# Patient Record
Sex: FEMALE | Race: WHITE | NOT HISPANIC OR LATINO | URBAN - METROPOLITAN AREA
[De-identification: names, ages, dates, MRNs, and addresses within clinical notes are randomized per-mention and may not be internally consistent; named-entity substitution may affect disease eponyms.]

---

## 2017-08-19 ENCOUNTER — EMERGENCY (EMERGENCY)
Facility: HOSPITAL | Age: 57
LOS: 1 days | Discharge: HOME | End: 2017-08-19
Attending: EMERGENCY MEDICINE | Admitting: EMERGENCY MEDICINE
Payer: COMMERCIAL

## 2017-08-19 VITALS
OXYGEN SATURATION: 99 % | HEIGHT: 58 IN | DIASTOLIC BLOOD PRESSURE: 78 MMHG | SYSTOLIC BLOOD PRESSURE: 145 MMHG | WEIGHT: 125 LBS | TEMPERATURE: 100 F | RESPIRATION RATE: 18 BRPM | HEART RATE: 77 BPM

## 2017-08-19 DIAGNOSIS — Z87.39 PERSONAL HISTORY OF OTHER DISEASES OF THE MUSCULOSKELETAL SYSTEM AND CONNECTIVE TISSUE: Chronic | ICD-10-CM

## 2017-08-19 DIAGNOSIS — Z98.89 OTHER SPECIFIED POSTPROCEDURAL STATES: Chronic | ICD-10-CM

## 2017-08-19 PROCEDURE — 99285 EMERGENCY DEPT VISIT HI MDM: CPT

## 2017-08-19 PROCEDURE — 70481 CT ORBIT/EAR/FOSSA W/DYE: CPT | Mod: 26

## 2017-08-19 RX ORDER — METOCLOPRAMIDE HCL 10 MG
10 TABLET ORAL ONCE
Qty: 0 | Refills: 0 | Status: COMPLETED | OUTPATIENT
Start: 2017-08-19 | End: 2017-08-19

## 2017-08-19 RX ORDER — ACETAMINOPHEN 500 MG
1000 TABLET ORAL ONCE
Qty: 0 | Refills: 0 | Status: COMPLETED | OUTPATIENT
Start: 2017-08-19 | End: 2017-08-19

## 2017-08-19 RX ADMIN — Medication 400 MILLIGRAM(S): at 21:22

## 2017-08-19 RX ADMIN — Medication 10 MILLIGRAM(S): at 21:36

## 2017-08-19 NOTE — ED PROVIDER NOTE - MEDICAL DECISION MAKING DETAILS
57F presenting with 2 day history of worsening eye swelling, pain, and redness. On exam, noted for proptosis, pain with EOM movement, photophobia and tenderness around orbit. No changes in visual acuity and negative fluorescin eye stain exam. Given clinical picture, suspicion of orbital cellulitis. Low suspicion for temporal arteritis given age group and symptoms.  Order CT orbit, esr, cbc, cmp 57F presenting with 2 day history of worsening eye swelling, pain, and redness. On exam, noted for proptosis, pain with EOM movement, photophobia and tenderness around orbit. No changes in visual acuity and negative fluorescin eye stain exam. Given clinical picture, suspicion of orbital cellulitis. Low suspicion for temporal arteritis given age group and symptoms.  Order CT orbit, esr, cbc, cmp, crp and reassess 57F presenting with 2 day history of worsening eye swelling, pain, and redness. On exam, noted for proptosis, pain with EOM movement, photophobia and tenderness around orbit. No changes in visual acuity and negative fluorescin eye stain exam. Given clinical picture, suspicion of orbital cellulitis. Low suspicion for temporal arteritis given symptoms and minimal pain at TA.   Order CT orbit, esr, cbc, cmp, crp and reassess

## 2017-08-19 NOTE — ED ADULT NURSE NOTE - PSH
H/O endoscopy    H/O left breast biopsy  benign, 2005  H/O synovitis  right wrist-1990  H/O unilateral salpingectomy  and unilateral fallopian tube reconstruction-1987  S/P arthroscopy of shoulder  left, 9/2014

## 2017-08-19 NOTE — ED PROVIDER NOTE - ATTENDING CONTRIBUTION TO CARE
58F hx htn presents to the ED from urgent care for right eye redness pain and swelling. Symptoms started 2 days ago. no trauma. no change in vision. Now pt with redness and proptosis. no fevers. no nausea or vomiting. pain worse with eye movement. no jaw claudication. didn't take anything for the pain. on exam pt with scleral injection to right eye with proptosis. mild pain over right temporal region. visual acuity 20/50 bilaterally. pressure 9 in each eye. no corneal abrasions on flourescene testing. Will obtain labs esr crp ct orbits r/o orbial cellulitis and reassess.     Constitutional: No fever or chills  Eyes: + right eye pain and proprotsis  HEENT: No throat pain  CV: No chest pain  Resp: No SOB no cough  GI: No abd pain, nausea or vomiting  : No dysuria  MSK: No musculoskeletal pain  Skin: No rash  Neuro: No headache    Constitutional: mild distress AAOx3  Eyes: PERRLA EOMI right eye proprtosis 20/50 b/l no abrasions presssure 9 b/l   Head: Normocephalic atraumatic  Mouth: MMM  Cardiac: regular rate   Resp: Lungs CTAB  GI: Abd s/nt/nd  Neuro: CN2-12 intact  Skin: No rashes

## 2017-08-19 NOTE — ED PROVIDER NOTE - PLAN OF CARE
1. return for worsening symptoms or anything concerning to you  2. take all home meds as prescribed  3. follow up with your pmd call to make an appointment  4. Take Tylenol 650 mg every 6 hours as needed for pain.  5. take clindamycin as directed  6. see your ophthalmologist in 1-2 days

## 2017-08-19 NOTE — ED ADULT NURSE NOTE - OBJECTIVE STATEMENT
58 y/o female presents to ED c/o right eye redness since Wednesday. Pt went to urgent care today and was told to come to ED to r/o cellulitis. Pt states she has had sinusitis. Pt c/o headache and inflammation of eyelid. Denies blurry or double vision. Affected area reddened, inflamed. Eye appears to bulge out. Pt family at bedside.

## 2017-08-19 NOTE — ED PROVIDER NOTE - OBJECTIVE STATEMENT
57F with PMH of HTN presents with 2 day history of eye swelling. States that she did not experience any inciting events, no trauma, no scratching. Endorses worsening eye swelling, photophobia, pain around the eye, proptosis, and redness of the eye. Patient went to urgent care center and was directed to come to the ED

## 2017-08-19 NOTE — ED PROVIDER NOTE - CARE PLAN
Principal Discharge DX:	Preseptal cellulitis of right eye  Instructions for follow-up, activity and diet:	1. return for worsening symptoms or anything concerning to you  2. take all home meds as prescribed  3. follow up with your pmd call to make an appointment  4. Take Tylenol 650 mg every 6 hours as needed for pain.  5. take clindamycin as directed  6. see your ophthalmologist in 1-2 days

## 2017-08-19 NOTE — ED ADULT NURSE NOTE - PMH
Amblyopia of eye, bilateral    Anxiety and depression    Arthritis    Bursitis  left arm  Carpal tunnel syndrome, bilateral    Cataract    Environmental allergies    GERD (gastroesophageal reflux disease)    Hiatal hernia    HTN (hypertension)    Leaky heart valve  pt reports history of "leaky valve", states it is now no longer there?  Morbidly obese    OAB (overactive bladder)    Sciatica    Seasonal asthma    Sinusitis

## 2017-08-20 VITALS
DIASTOLIC BLOOD PRESSURE: 78 MMHG | OXYGEN SATURATION: 99 % | SYSTOLIC BLOOD PRESSURE: 127 MMHG | RESPIRATION RATE: 18 BRPM | HEART RATE: 72 BPM

## 2017-08-20 LAB — CRP SERPL-MCNC: 0.6 MG/DL — HIGH (ref 0–0.4)

## 2017-08-20 PROCEDURE — 96374 THER/PROPH/DIAG INJ IV PUSH: CPT | Mod: XU

## 2017-08-20 PROCEDURE — 85652 RBC SED RATE AUTOMATED: CPT

## 2017-08-20 PROCEDURE — 99284 EMERGENCY DEPT VISIT MOD MDM: CPT | Mod: 25

## 2017-08-20 PROCEDURE — 70481 CT ORBIT/EAR/FOSSA W/DYE: CPT

## 2017-08-20 PROCEDURE — 80053 COMPREHEN METABOLIC PANEL: CPT

## 2017-08-20 PROCEDURE — 85027 COMPLETE CBC AUTOMATED: CPT

## 2017-08-20 PROCEDURE — 86140 C-REACTIVE PROTEIN: CPT

## 2017-08-20 PROCEDURE — 96375 TX/PRO/DX INJ NEW DRUG ADDON: CPT | Mod: XU

## 2017-08-20 RX ADMIN — Medication 100 MILLIGRAM(S): at 00:39

## 2017-08-20 RX ADMIN — Medication 1000 MILLIGRAM(S): at 00:37

## 2017-08-22 ENCOUNTER — APPOINTMENT (OUTPATIENT)
Dept: OPHTHALMOLOGY | Facility: CLINIC | Age: 57
End: 2017-08-22
Payer: COMMERCIAL

## 2017-08-22 DIAGNOSIS — H49.21 SIXTH [ABDUCENT] NERVE PALSY, RIGHT EYE: ICD-10-CM

## 2017-08-22 PROBLEM — Z00.00 ENCOUNTER FOR PREVENTIVE HEALTH EXAMINATION: Status: ACTIVE | Noted: 2017-08-22

## 2017-08-22 PROCEDURE — 99205 OFFICE O/P NEW HI 60 MIN: CPT

## 2017-08-30 RX ADMIN — OXYCODONE HYDROCHLORIDE 5 MILLIGRAM(S): 5 TABLET ORAL at 23:12

## 2017-08-31 ENCOUNTER — INPATIENT (INPATIENT)
Facility: HOSPITAL | Age: 57
LOS: 2 days | Discharge: HOME | DRG: 125 | End: 2017-09-03
Attending: INTERNAL MEDICINE | Admitting: INTERNAL MEDICINE
Payer: COMMERCIAL

## 2017-08-31 ENCOUNTER — APPOINTMENT (OUTPATIENT)
Dept: OPHTHALMOLOGY | Facility: CLINIC | Age: 57
End: 2017-08-31
Payer: COMMERCIAL

## 2017-08-31 VITALS
OXYGEN SATURATION: 100 % | SYSTOLIC BLOOD PRESSURE: 123 MMHG | TEMPERATURE: 100 F | HEART RATE: 85 BPM | RESPIRATION RATE: 14 BRPM | DIASTOLIC BLOOD PRESSURE: 69 MMHG | WEIGHT: 125 LBS

## 2017-08-31 DIAGNOSIS — Z98.89 OTHER SPECIFIED POSTPROCEDURAL STATES: Chronic | ICD-10-CM

## 2017-08-31 DIAGNOSIS — Z87.39 PERSONAL HISTORY OF OTHER DISEASES OF THE MUSCULOSKELETAL SYSTEM AND CONNECTIVE TISSUE: Chronic | ICD-10-CM

## 2017-08-31 DIAGNOSIS — H57.12 OCULAR PAIN, LEFT EYE: ICD-10-CM

## 2017-08-31 LAB
ALBUMIN SERPL ELPH-MCNC: 4 G/DL — SIGNIFICANT CHANGE UP (ref 3.3–5)
ALP SERPL-CCNC: 43 U/L — SIGNIFICANT CHANGE UP (ref 40–120)
ALT FLD-CCNC: 22 U/L RC — SIGNIFICANT CHANGE UP (ref 10–45)
ANION GAP SERPL CALC-SCNC: 11 MMOL/L — SIGNIFICANT CHANGE UP (ref 5–17)
ANION GAP SERPL CALC-SCNC: 12 MMOL/L — SIGNIFICANT CHANGE UP (ref 5–17)
AST SERPL-CCNC: 72 U/L — HIGH (ref 10–40)
BASE EXCESS BLDV CALC-SCNC: 3.3 MMOL/L — HIGH (ref -2–2)
BASOPHILS # BLD AUTO: 0.1 K/UL — SIGNIFICANT CHANGE UP (ref 0–0.2)
BASOPHILS NFR BLD AUTO: 0.7 % — SIGNIFICANT CHANGE UP (ref 0–2)
BILIRUB SERPL-MCNC: 0.2 MG/DL — SIGNIFICANT CHANGE UP (ref 0.2–1.2)
BUN SERPL-MCNC: 16 MG/DL — SIGNIFICANT CHANGE UP (ref 7–23)
BUN SERPL-MCNC: 16 MG/DL — SIGNIFICANT CHANGE UP (ref 7–23)
CA-I SERPL-SCNC: 1.2 MMOL/L — SIGNIFICANT CHANGE UP (ref 1.12–1.3)
CALCIUM SERPL-MCNC: 9.4 MG/DL — SIGNIFICANT CHANGE UP (ref 8.4–10.5)
CALCIUM SERPL-MCNC: 9.6 MG/DL — SIGNIFICANT CHANGE UP (ref 8.4–10.5)
CHLORIDE BLDV-SCNC: 100 MMOL/L — SIGNIFICANT CHANGE UP (ref 96–108)
CHLORIDE SERPL-SCNC: 96 MMOL/L — SIGNIFICANT CHANGE UP (ref 96–108)
CHLORIDE SERPL-SCNC: 97 MMOL/L — SIGNIFICANT CHANGE UP (ref 96–108)
CO2 BLDV-SCNC: 29 MMOL/L — SIGNIFICANT CHANGE UP (ref 22–30)
CO2 SERPL-SCNC: 25 MMOL/L — SIGNIFICANT CHANGE UP (ref 22–31)
CO2 SERPL-SCNC: 26 MMOL/L — SIGNIFICANT CHANGE UP (ref 22–31)
CREAT SERPL-MCNC: 0.51 MG/DL — SIGNIFICANT CHANGE UP (ref 0.5–1.3)
CREAT SERPL-MCNC: 0.64 MG/DL — SIGNIFICANT CHANGE UP (ref 0.5–1.3)
CRP SERPL-MCNC: 0.4 MG/DL — SIGNIFICANT CHANGE UP (ref 0–0.4)
EOSINOPHIL # BLD AUTO: 0 K/UL — SIGNIFICANT CHANGE UP (ref 0–0.5)
EOSINOPHIL NFR BLD AUTO: 0.6 % — SIGNIFICANT CHANGE UP (ref 0–6)
ERYTHROCYTE [SEDIMENTATION RATE] IN BLOOD: 34 MM/HR — HIGH (ref 0–20)
GAS PNL BLDV: 131 MMOL/L — LOW (ref 136–145)
GAS PNL BLDV: SIGNIFICANT CHANGE UP
GAS PNL BLDV: SIGNIFICANT CHANGE UP
GLUCOSE BLDV-MCNC: 88 MG/DL — SIGNIFICANT CHANGE UP (ref 70–99)
GLUCOSE SERPL-MCNC: 113 MG/DL — HIGH (ref 70–99)
GLUCOSE SERPL-MCNC: 87 MG/DL — SIGNIFICANT CHANGE UP (ref 70–99)
HCO3 BLDV-SCNC: 28 MMOL/L — SIGNIFICANT CHANGE UP (ref 21–29)
HCT VFR BLD CALC: 40.4 % — SIGNIFICANT CHANGE UP (ref 34.5–45)
HCT VFR BLDA CALC: 39 % — SIGNIFICANT CHANGE UP (ref 39–50)
HGB BLD CALC-MCNC: 12.7 G/DL — SIGNIFICANT CHANGE UP (ref 11.5–15.5)
HGB BLD-MCNC: 13.3 G/DL — SIGNIFICANT CHANGE UP (ref 11.5–15.5)
LACTATE BLDV-MCNC: 0.7 MMOL/L — SIGNIFICANT CHANGE UP (ref 0.7–2)
LYMPHOCYTES # BLD AUTO: 2.6 K/UL — SIGNIFICANT CHANGE UP (ref 1–3.3)
LYMPHOCYTES # BLD AUTO: 32.8 % — SIGNIFICANT CHANGE UP (ref 13–44)
MCHC RBC-ENTMCNC: 32.5 PG — SIGNIFICANT CHANGE UP (ref 27–34)
MCHC RBC-ENTMCNC: 32.9 GM/DL — SIGNIFICANT CHANGE UP (ref 32–36)
MCV RBC AUTO: 98.8 FL — SIGNIFICANT CHANGE UP (ref 80–100)
MONOCYTES # BLD AUTO: 0.4 K/UL — SIGNIFICANT CHANGE UP (ref 0–0.9)
MONOCYTES NFR BLD AUTO: 5.3 % — SIGNIFICANT CHANGE UP (ref 2–14)
NEUTROPHILS # BLD AUTO: 4.8 K/UL — SIGNIFICANT CHANGE UP (ref 1.8–7.4)
NEUTROPHILS NFR BLD AUTO: 60.6 % — SIGNIFICANT CHANGE UP (ref 43–77)
OTHER CELLS CSF MANUAL: 13 ML/DL — LOW (ref 18–22)
PCO2 BLDV: 43 MMHG — SIGNIFICANT CHANGE UP (ref 35–50)
PH BLDV: 7.42 — SIGNIFICANT CHANGE UP (ref 7.35–7.45)
PLATELET # BLD AUTO: 361 K/UL — SIGNIFICANT CHANGE UP (ref 150–400)
PO2 BLDV: 42 MMHG — SIGNIFICANT CHANGE UP (ref 25–45)
POTASSIUM BLDV-SCNC: 3.7 MMOL/L — SIGNIFICANT CHANGE UP (ref 3.5–5)
POTASSIUM SERPL-MCNC: 3.9 MMOL/L — SIGNIFICANT CHANGE UP (ref 3.5–5.3)
POTASSIUM SERPL-MCNC: 9.6 MMOL/L — CRITICAL HIGH (ref 3.5–5.3)
POTASSIUM SERPL-SCNC: 3.9 MMOL/L — SIGNIFICANT CHANGE UP (ref 3.5–5.3)
POTASSIUM SERPL-SCNC: 9.6 MMOL/L — CRITICAL HIGH (ref 3.5–5.3)
PROT SERPL-MCNC: 8.6 G/DL — HIGH (ref 6–8.3)
RBC # BLD: 4.09 M/UL — SIGNIFICANT CHANGE UP (ref 3.8–5.2)
RBC # FLD: 12.8 % — SIGNIFICANT CHANGE UP (ref 10.3–14.5)
SAO2 % BLDV: 80 % — SIGNIFICANT CHANGE UP (ref 67–88)
SODIUM SERPL-SCNC: 133 MMOL/L — LOW (ref 135–145)
SODIUM SERPL-SCNC: 134 MMOL/L — LOW (ref 135–145)
T3 SERPL-MCNC: 98 NG/DL — SIGNIFICANT CHANGE UP (ref 80–200)
T4 AB SER-ACNC: 7.3 UG/DL — SIGNIFICANT CHANGE UP (ref 4.6–12)
TSH SERPL-MCNC: 1.22 UIU/ML — SIGNIFICANT CHANGE UP (ref 0.27–4.2)
WBC # BLD: 8 K/UL — SIGNIFICANT CHANGE UP (ref 3.8–10.5)
WBC # FLD AUTO: 8 K/UL — SIGNIFICANT CHANGE UP (ref 3.8–10.5)

## 2017-08-31 PROCEDURE — 99220: CPT

## 2017-08-31 PROCEDURE — 99215 OFFICE O/P EST HI 40 MIN: CPT

## 2017-08-31 PROCEDURE — 70450 CT HEAD/BRAIN W/O DYE: CPT | Mod: 26

## 2017-08-31 PROCEDURE — 92060 SENSORIMOTOR EXAMINATION: CPT

## 2017-08-31 RX ORDER — IBUPROFEN 200 MG
600 TABLET ORAL ONCE
Qty: 0 | Refills: 0 | Status: COMPLETED | OUTPATIENT
Start: 2017-08-31 | End: 2017-08-31

## 2017-08-31 RX ORDER — ACETAMINOPHEN 500 MG
1000 TABLET ORAL ONCE
Qty: 0 | Refills: 0 | Status: COMPLETED | OUTPATIENT
Start: 2017-08-31 | End: 2017-08-31

## 2017-08-31 RX ORDER — OXYCODONE HYDROCHLORIDE 5 MG/1
5 TABLET ORAL ONCE
Qty: 0 | Refills: 0 | Status: DISCONTINUED | OUTPATIENT
Start: 2017-08-31 | End: 2017-08-31

## 2017-08-31 RX ADMIN — Medication 400 MILLIGRAM(S): at 19:33

## 2017-08-31 RX ADMIN — OXYCODONE HYDROCHLORIDE 5 MILLIGRAM(S): 5 TABLET ORAL at 22:38

## 2017-08-31 RX ADMIN — Medication 1000 MILLIGRAM(S): at 20:12

## 2017-08-31 RX ADMIN — Medication 600 MILLIGRAM(S): at 12:40

## 2017-08-31 NOTE — ED CDU PROVIDER NOTE - ATTENDING CONTRIBUTION TO CARE
I have personally performed a face to face diagnostic evaluation on this patient.  I have reviewed the ACP note and agree with the history, exam, and plan of care, except as noted.  History and Exam by me shows  See ED provider note  Horacio Cevallos MD, Facep

## 2017-08-31 NOTE — ED ADULT NURSE REASSESSMENT NOTE - EENT WDL
Pts pupils are dilated for examination purpose by the ophthalmologist   Ears clean and dry and no hearing difficulties. Nose with pink mucosa and no drainage.  Mouth mucous membranes moist and pink.  No tenderness or swelling to throat or neck.
Eyes with no visual disturbances.  Ears clean and dry and no hearing difficulties. Nose with pink mucosa and no drainage.  Mouth mucous membranes moist and pink.  No tenderness or swelling to throat or neck.

## 2017-08-31 NOTE — CONSULT NOTE ADULT - ASSESSMENT
57 year old woman with known R 6th nerve palsy within last 2 weeks presents with eye pain on L with limited abduction.

## 2017-08-31 NOTE — ED CDU PROVIDER NOTE - PLAN OF CARE
Follow up with your Primary Care Physician within the next 2-3 days  Follow up with Neurology  Follow up with Ophthalmology  Bring a copy of your test results with you to your appointment  Continue your current medication regimen  Return to the Emergency Room if you experience new or worsening symptoms

## 2017-08-31 NOTE — ED CDU PROVIDER NOTE - MEDICAL DECISION MAKING DETAILS
57y pmh recently treated for left orbit swelling?infection now pw rt eye symptoms 6th nerve/ blurry vision for MRI to further eval process.

## 2017-08-31 NOTE — CONSULT NOTE ADULT - SUBJECTIVE AND OBJECTIVE BOX
NEUROLOGY CONSULT  MILDRED RESENDEZ       SUBJECTIVE  HPI:    57y old   woman who presents to ED referred by eye doctor with a chief complaint of left eye pain and decreased movement.  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  She had a normal MRI brain done since onset of symptoms within the last 10 days. She has associated pain around her eye which is worse with extremes of gaze.    She has a lifetime history of lazy eye on R which is more prominent when she is tired.  REVIEW OF SYSTEMS      General:	    Skin/Breast:  	  Ophthalmologic:  	  ENMT:	    Respiratory and Thorax:  	  Cardiovascular:	    Gastrointestinal:	    Genitourinary:	    Musculoskeletal:	    Neurological:	    Psychiatric:	    Hematology/Lymphatics:	    Endocrine:	    Allergic/Immunologic:	    PAST MEDICAL & SURGICAL HISTORY:  Leaky heart valve: pt reports history of &quot;leaky valve&quot;, states it is now no longer there?  Arthritis  Hiatal hernia  Amblyopia of eye, bilateral  Morbidly obese  Bursitis: left arm  Carpal tunnel syndrome, bilateral  Cataract  Sciatica  GERD (gastroesophageal reflux disease)  Sinusitis  Anxiety and depression  OAB (overactive bladder)  Environmental allergies  Seasonal asthma  HTN (hypertension)  S/P arthroscopy of shoulder: left, 9/2014  H/O endoscopy  H/O left breast biopsy: benign, 2005  H/O synovitis: right wrist-1990  H/O unilateral salpingectomy: and unilateral fallopian tube reconstruction-1987          latex (Pruritus)  No Known Drug Allergies      FAMILY HISTORY:  Family history of hypertension (Father)  Family history of cardiac pacemaker (Father)  Family history of discoid lupus (Mother)    Social History:    Marital Status:  (   )    (   ) Single    (   )    (  )   Occupation:   Lives with: (  ) alone  (  ) children   (  ) spouse   (  ) parents  (  ) other    Substance Use (street drugs): (  ) never used  (  ) other:  Tobacco Usage:  (   ) never smoked   (   ) former smoker   (   ) current smoker  (     ) pack year  (        ) last cigarette date  Alcohol Usage:  Sexual History:     Health Management     For female:   Last Mammo: (     ) No  (    ) Yes  (    ) Normal  (    ) Date   Last Pap: (     ) No  (    ) Yes  (    ) Normal   (    ) Date     For male:  Last prostate exam: (     ) No  (    ) Yes  (    ) Date  (    ) Normal    Immunization Hx:   (  ) flu shot                               (     ) date   (  ) pneumonia shot               (     ) date  (  ) tetanus                               (     ) date     (     ) Advanced Directives: (     ) None    (      ) DNR    (     ) DNI    (     ) Health Care Proxy:     OBJECTIVE  T(C): 37.6 (08-31-17 @ 11:25), Max: 37.6 (08-31-17 @ 11:25)  HR: 87 (08-31-17 @ 11:45) (85 - 87)  BP: 126/79 (08-31-17 @ 11:45) (123/69 - 126/79)  RR: 18 (08-31-17 @ 11:45) (14 - 18)  SpO2: 98% (08-31-17 @ 11:45) (98% - 100%)  Wt(kg): --      PHYSICAL EXAM:      Constitutional:    Eyes:    ENMT:    Neck:    Breasts:    Back:    Respiratory:    Cardiovascular:    Gastrointestinal:    Genitourinary:    Rectal:    Extremities:    Vascular:    Neurological:    Skin:    Lymph Nodes:    Musculoskeletal:    Psychiatric:            CAPILLARY BLOOD GLUCOSE        08-31    133<L>  |  96  |  16  ----------------------------<  113<H>  9.6<HH>   |  26  |  0.64    Ca    9.6      31 Aug 2017 12:26    TPro  8.6<H>  /  Alb  4.0  /  TBili  0.2  /  DBili  x   /  AST  72<H>  /  ALT  22  /  AlkPhos  43  08-31      CBC Full  -  ( 31 Aug 2017 12:26 )  WBC Count : 8.0 K/uL  Hemoglobin : 13.3 g/dL  Hematocrit : 40.4 %  Platelet Count - Automated : 361 K/uL  Mean Cell Volume : 98.8 fl  Mean Cell Hemoglobin : 32.5 pg  Mean Cell Hemoglobin Concentration : 32.9 gm/dL  Auto Neutrophil # : 4.8 K/uL  Auto Lymphocyte # : 2.6 K/uL  Auto Monocyte # : 0.4 K/uL  Auto Eosinophil # : 0.0 K/uL  Auto Basophil # : 0.1 K/uL  Auto Neutrophil % : 60.6 %  Auto Lymphocyte % : 32.8 %  Auto Monocyte % : 5.3 %  Auto Eosinophil % : 0.6 %  Auto Basophil % : 0.7 %          RADIOLOGY RESULTS: NEUROLOGY CONSULT  MILDRED RESENDEZ       SUBJECTIVE  57y old   woman who presents to ED referred by eye doctor with a chief complaint of left eye pain and decreased movement.  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  She had a normal MRI brain done since onset of symptoms within the last 10 days. She has associated pain around her L eye which is worse with extremes of gaze.    She has a lifetime history of lazy eye on R which is more prominent when she is tired.  REVIEW OF SYSTEMS      General:	no fever    Ophthalmologic: no diplopia.  per hpi  	  ENMT:	no trouble swallowing    Gastrointestinal:	 no vomiting    Musculoskeletal:	no weakness    Neurological:	no tingling no preceeding headache    Hematology/Lymphatics:	+ lymph nodes in neck    PAST MEDICAL & SURGICAL HISTORY:  Leaky heart valve: pt reports history of &quot;leaky valve&quot;, states it is now no longer there?  Arthritis  Hiatal hernia  Amblyopia of eye, bilateral  Morbidly obese  Bursitis: left arm  Carpal tunnel syndrome, bilateral  Cataract  Sciatica  GERD (gastroesophageal reflux disease)  Sinusitis  Anxiety and depression  OAB (overactive bladder)  Environmental allergies  Seasonal asthma  HTN (hypertension)  S/P arthroscopy of shoulder: left, 9/2014  H/O endoscopy  H/O left breast biopsy: benign, 2005  H/O synovitis: right wrist-1990  H/O unilateral salpingectomy: and unilateral fallopian tube reconstruction-1987      latex (Pruritus)  No Known Drug Allergies      FAMILY HISTORY:  Family history of hypertension (Father)  Family history of cardiac pacemaker (Father)  Family history of discoid lupus (Mother)    Social History:    Marital Status:  (  x )    (   ) Single    (   )    (  )   Lives with: (  ) alone  (  ) children   ( x ) spouse   (  ) parents  (  ) other    Substance Use (street drugs): (x  ) never used  (  ) other:  Tobacco Usage:  (   ) never smoked   (   ) former smoker   (  x ) current smoker  (  30   ) pack years  (  today      ) last cigarette date  Alcohol Usage: rare social    OBJECTIVE  T(C): 37.6 (08-31-17 @ 11:25), Max: 37.6 (08-31-17 @ 11:25)  HR: 87 (08-31-17 @ 11:45) (85 - 87)  BP: 126/79 (08-31-17 @ 11:45) (123/69 - 126/79)  RR: 18 (08-31-17 @ 11:45) (14 - 18)  SpO2: 98% (08-31-17 @ 11:45) (98% - 100%)  Wt(kg): --      PHYSICAL EXAM:      Constitutional: WDWN woman wearing large dark wraparound glasses     Eyes: + photophobia PERRLA     ENMT: normal oropharynx    Respiratory: normal rhythm and effort no accessory muscle use    Neurological:  Mental Status: AAOX4, fluent, attends b/l, appropriate  Cranial Nerves: Visual acuity intact bilaterally, visual fields full to confrontation, pupils equal round and reactive to light, extraocular motion partially limited abduction bilaterally, facial sensation intact symmetrically in V1-V3 bilaterally, face symmetrical, hearing was intact bilaterally,shoulder shrug symmetric and there was no tongue deviation with protrusion.  Motor: 5/5 strength in all extremities, normal tone and bulk.  Deep tendon reflexes:   Biceps/BR right 2+. Biceps/BR left 2+  Patella right 2+. Patella left 2+.  Plantar response downgoing bilaterally     Skin: warm and dry    Lymph Nodes: POSITIVE extensive anterior and posterior cervical lymphadenopathy (firm)    Psychiatric: normal mood affect and behavior        08-31    133<L>  |  96  |  16  ----------------------------<  113<H>  9.6<HH>   |  26  |  0.64    Ca    9.6      31 Aug 2017 12:26    TPro  8.6<H>  /  Alb  4.0  /  TBili  0.2  /  DBili  x   /  AST  72<H>  /  ALT  22  /  AlkPhos  43  08-31      CBC Full  -  ( 31 Aug 2017 12:26 )  WBC Count : 8.0 K/uL  Hemoglobin : 13.3 g/dL  Hematocrit : 40.4 %  Platelet Count - Automated : 361 K/uL  Mean Cell Volume : 98.8 fl  Mean Cell Hemoglobin : 32.5 pg  Mean Cell Hemoglobin Concentration : 32.9 gm/dL  Auto Neutrophil # : 4.8 K/uL  Auto Lymphocyte # : 2.6 K/uL  Auto Monocyte # : 0.4 K/uL  Auto Eosinophil # : 0.0 K/uL  Auto Basophil # : 0.1 K/uL  Auto Neutrophil % : 60.6 %  Auto Lymphocyte % : 32.8 %  Auto Monocyte % : 5.3 %  Auto Eosinophil % : 0.6 %  Auto Basophil % : 0.7 %          RADIOLOGY RESULTS:  < from: CT Orbit w/ IV Cont (08.19.17 @ 22:11) >  Mild right periorbital soft tissue swelling. The retrobulbar fat is   preserved. Both globesare intact. The extraocular muscles remain   symmetric. The superior ophthalmic veins are also symmetric. The orbital   rims are intact. The regional osseous structures are unremarkable.      < end of copied text >

## 2017-08-31 NOTE — ED CDU PROVIDER NOTE - FAMILY HISTORY
Mother  Still living? No  Family history of discoid lupus, Age at diagnosis: Age Unknown     Father  Still living? No  Family history of cardiac pacemaker, Age at diagnosis: Age Unknown  Family history of hypertension, Age at diagnosis: Age Unknown

## 2017-08-31 NOTE — ED PROVIDER NOTE - PROGRESS NOTE DETAILS
spoke with neuro, recommend MRI brain and orbits, may dc if normal. - PITER Salas ricardo saw pt, spoke with optho. outpt opthoneurologist, Dr. Bender, had recommended LP. Spoke with Dr. Bender will defer LP to neuro recommendations. neuro only recommending MRI only at this point as it is more likely microvascular cause so no need for LP at this point. - PITER Salas Dr. Padron; pt admitted as mri abnormal infectious vs neoplastic/granulomatous; iv antibiotics neuro/ophtho following

## 2017-08-31 NOTE — ED PROVIDER NOTE - CHPI ED SYMPTOMS NEG
no blurred vision/no vomiting/no numbness/no loss of consciousness/no confusion/no change in level of consciousness no loss of consciousness/no numbness/no vomiting/no blurred vision/no confusion

## 2017-08-31 NOTE — CONSULT NOTE ADULT - PROBLEM SELECTOR RECOMMENDATION 9
Check intraocular pressure rule out acute angle gluacoma.  Check ESR CRP to rule out GCA.  Suspect partial sixth nerve palsy on L.  It is possible to have microvascular ischemic cranial nerve palsies more than once.  The patient shows no signs of increased or decreased intracranial pressure which can occasionally cause bilateral sixth nerve palsies, though they would typically occur simultaneously. The proximity of the two nerve palsies raises the question of leptomeningeal process in the setting of heavy smoking and cervical LAD, thus I recommend repeating the MRI brain and orbits with/without contrast. Less likely ischemic cerebral event.

## 2017-08-31 NOTE — ED CDU PROVIDER NOTE - OBJECTIVE STATEMENT
Private Physician Nailni OWENS, Private Physician Katerine [address]  57yof pmhx Hypertension, Tobacco 1ppd, No dm,hld, allergy latex, cancer, coronary artery disease, travel. Pt comes to ed complains of rt eye swelling 8/17 seen in ED CT showed periorbital sts, esr/crp slightly elevated. Pt dc to [facility], MRI told to return in two weeks, Rt eye has gradually improved, Now left eye has become painful seen today by optho/[facility] and referred back to ed. Co blurry vision, with occasional diplopia, with photophobia while in direct sunlight. Has pain around left eye, no pain at present. Tylenol Lasts two hours. Nausea without vomiting, Pain worse with moving eyes. +dizzyness and feeling off balance.

## 2017-08-31 NOTE — CONSULT NOTE ADULT - ATTENDING COMMENTS
VASCULAR NEUROLOGY ATTENDING  The patient is seen and examined the history and imaging are reviewed. I agree with the resident note unless otherwise noted. L 6th nerve palsey. Patient with HTN and extensive smoking history. Advised to quit smoking, will need aggressive vascular risk factor modification. Outpatient neuro follow up.

## 2017-08-31 NOTE — ED PROVIDER NOTE - OBJECTIVE STATEMENT
Private Physician Nalini OWENS, Private Physician Katerine [address]  57y female pmh Hypertension, Tobacco 1ppd, No dm,hld, allergy latex, cancer, coronary artery disease, travel. Pt comes to ed complains of rt eye swelling 8/17 seen in ED CT showed periorbital sts, esr/crp slightly elevated. Pt dc to [facility], MRI told to return in two weeks, Rt eye has gradually improved, Now left eye has become painful seen today by optho/[facility] and referred back to ed. Co blurry vision, with occasional diplopia, with photophobia while in direct sunlight. Has pain around left eye, no pain at present. Tylenol Lasts two hours. Nausea without vomiting, Pain worse with moving eyes. +dizzyness and feeling off balance.

## 2017-08-31 NOTE — ED CDU PROVIDER NOTE - CHPI ED SYMPTOMS NEG
no confusion/no change in level of consciousness/no numbness/no blurred vision/no vomiting/no loss of consciousness

## 2017-08-31 NOTE — ED CDU PROVIDER NOTE - PROGRESS NOTE DETAILS
Patient resting in bed comfortably. No distress, no complaints. Vital Signs Stable. Patient reports mild headache. Will try IV tylenol. -Reji Wright PA-C patients  wants the chart to reflect that her voice has been a higher volume than previously for the past few months, and she often asks "excuse me" which may reflect recent changes in hearing. - Reji Wright PA-C Patient resting in bed comfortably. No distress. Vital Signs Stable. Headache and eye pain was relieved mildly with IV tylenol, and oxycodone 5 mg was given for additional pain control, will try motrin and valium if pain persists.  -Reji Wright PA-C Patient resting in bed comfortably. No distress, headache improved with Motrin and Valium. Vital Signs stable. MRI performed, awaiting result. - Reji Wright PA-C Pt c/o headache. Oxycodone ordered. Vital signs stable. Will continue to observe and reassess. Awaiting MRI results. -Alison Soliman PA-C I Clari Padron have personally performed a face to face diagnostic evaluation on this patient.  I have reviewed the ACP note and agree with the history, exam, and plan of care, except as noted.  History and Exam by me shows patient with continued diplopia; on exam + lateral rectus palsy, mild periorbital swelling; awaiting mri results; evaluated by neuro and ophtho, reassess Awaiting official MRI results. Pt seen by neurology and cleared for discharge as an outpatient. Pt c/o intermittent headache and diplopia.  Pt seen by optho who will discuss with oculoplastics and give further recommendations. D/W Dr. Padron. Ivania Soliman PA-C Dr. Padron; pt admitted for further work-up ; no antibiotics per id; admitted to medicine; MRI shows preseptal soft tissue swelling with differential diagnoses of orbital cellulitis, sarcoid, pseudotumor, neoplasm.  Still awaiting optho final recs. Discussed with Dr Padron who recommend admission to the hospital for IV antibiotics and further work up. -Alison Soliman PA-C

## 2017-08-31 NOTE — CONSULT NOTE ADULT - ASSESSMENT
Assessment  58 yo F, 2 weeks ago had right sided periocular pain, periorbital swelling and CN6 palsy. Now OD swelling resolved, CN6 palsy mostly resolved. But 5 days ago developed the same set of events in the left eye - periocular pain, swelling, eye turning in. Periocular swelling OD improving. Seen by neuro-ophthalmology today, Dx with left sided (now b/l) CN6 palsy and sent in for workup. DDx per neuro-ophthalmology: myasthenia (although does not explain periorbital swelling), orbital inflammatory disease, thyroid eye disease, orbital cellulitis, meningitis.    Neuro consult  Repeat MRI brain and orbits with and without contrast  Acetyl choline antibodies (binding, blocking, modulating)  Anti-MUSK antibodies  TFTs Assessment  56 yo F, 2 weeks ago had right sided periocular pain, periorbital swelling and CN6 palsy. Now OD swelling resolved, CN6 palsy mostly resolved. But 5 days ago developed the same set of events in the left eye - periocular pain, swelling, eye turning in. Periocular swelling OD improving. Seen by neuro-ophthalmology today, Dx with left sided (now b/l) CN6 palsy and sent in for workup. DDx per neuro-ophthalmology: myasthenia (although does not explain periorbital swelling), orbital inflammatory disease, thyroid eye disease, orbital cellulitis, meningitis.    Neuro consult  Repeat MRI brain and orbits with and without contrast  Acetylcholine receptor antibodies (binding, blocking, modulating)  Anti-MUSK antibodies  TFTs  Decision to perform LP per neuro    S/D/W Dr Mendez (attending) Assessment  58 yo F, 2 weeks ago had right sided periocular pain, periorbital swelling and CN6 palsy. Now OD swelling resolved, CN6 palsy mostly resolved. But 5 days ago developed the same set of events in the left eye - periocular pain, swelling, eye turning in. Periocular swelling OD improving. Seen by neuro-ophthalmology today, Dx with left sided (now b/l) CN6 palsy and sent in for workup. DDx per neuro-ophthalmology: myasthenia (although does not explain periorbital swelling), orbital inflammatory disease, thyroid eye disease, orbital cellulitis, meningitis.    Neuro consult  Repeat MRI brain and orbits with and without contrast  Acetylcholine receptor antibodies (binding, blocking, modulating)  Anti-MUSK antibodies  TFTs  Decision to perform LP per neuro. If LP performed, please also do opening pressure as increased ICP could conceivably cause b/l CN6 palsies and papilledema is not always seen in high ICP.    S/D/W Dr Mendez (attending) Assessment  58 yo F, 2 weeks ago had right sided periocular pain, periorbital swelling and CN6 palsy. Now OD swelling resolved, CN6 palsy mostly resolved. But 5 days ago developed the same set of events in the left eye - periocular pain, swelling, eye turning in. Periocular swelling OD improving. Seen by neuro-ophthalmology today, Dx with left sided (now b/l) CN6 palsy and sent in for workup. DDx per neuro-ophthalmology: myasthenia (although does not explain periorbital swelling), orbital inflammatory disease, thyroid eye disease, orbital cellulitis, meningitis.    Neuro consult  Repeat MRI brain and orbits with and without contrast  Acetylcholine receptor antibodies (binding, blocking, modulating)  Anti-MUSK antibodies  TFTs  Decision to perform LP per neuro but ophtho would lean towards doing it. If LP performed, please also do opening pressure as increased ICP could conceivably cause b/l CN6 palsies and papilledema is not always seen in high ICP, also please add cytology.    S/D/W Dr Mendez (attending) Assessment  56 yo F, 2 weeks ago had right sided periocular pain, periorbital swelling and CN6 palsy. Now OD swelling resolved, CN6 palsy mostly resolved. But 5 days ago developed the same set of events in the left eye - periocular pain, swelling, eye turning in. Periocular swelling OD improving. Seen by neuro-ophthalmology today, Dx with left sided (now b/l) CN6 palsy and sent in for workup. DDx per neuro-ophthalmology: myasthenia (although does not explain periorbital swelling), orbital inflammatory disease, thyroid eye disease, orbital cellulitis, meningitis.    Neuro consult  Repeat MRI brain and orbits with and without contrast  Acetylcholine receptor antibodies (binding, blocking, modulating)  Anti-MUSK antibodies  TFTs  Decision to perform LP per neuro. If LP performed, please also do opening pressure as increased ICP could conceivably cause b/l CN6 palsies and papilledema is not always seen in high ICP.    S/D/W Dr Mendez (attending)

## 2017-08-31 NOTE — ED CDU PROVIDER NOTE - CRANIAL NERVE AND PUPILLARY EXAM
cranial nerves 2-12 intact Spine appears normal, range of motion is not limited, no muscle or joint tenderness

## 2017-08-31 NOTE — ED ADULT NURSE NOTE - OBJECTIVE STATEMENT
Pt AXOX4, gross neuro intact, c/o worsening left eye and temporal pain since Sunday.  Pt states that since the 19th pt has been experiencing ongoing eye issues. Pt states that pain was in right eye at first and had CT on 19th with negative findings and told to follow up with opthalmology.  Pt went for MRI with negative findings and was diagnosed with 6th Nerve Palsy.  Pt calm, cooperative.  Pt reports blurred and at times doubled vision.  Pt well appearing.  Pt denies vomiting, f/c, bleeding, dark stools, injury/falls, dizziness.  Pt states today she did experience dizziness and nausea.  Pt currently denies both symptoms.  Pt well appearing.  VSS.   at bedside.  Will continue to monitor. Pt AXOX4, gross neuro intact, c/o worsening left eye and temporal pain since Sunday.  Pt states that since the 19th pt has been experiencing ongoing eye issues. Pt states that pain was in right eye at first and had CT on 19th with negative findings and told to follow up with opthalmology.  Pt went for MRI with negative findings and was diagnosed with 6th Nerve Palsy.  Pt calm, cooperative.  Pt reports blurred and at times doubled vision.  Pt well appearing.  Pt denies vomiting, f/c, bleeding, dark stools, injury/falls, dizziness.  Pt states today she did experience dizziness and nausea.  Pt currently denies both symptoms.  See neuro flow sheet in chart.  Pt well appearing.  VSS.   at bedside.  Will continue to monitor.

## 2017-09-01 DIAGNOSIS — H57.12 OCULAR PAIN, LEFT EYE: ICD-10-CM

## 2017-09-01 PROCEDURE — 70543 MRI ORBT/FAC/NCK W/O &W/DYE: CPT | Mod: 26

## 2017-09-01 PROCEDURE — 99217: CPT

## 2017-09-01 PROCEDURE — 71250 CT THORAX DX C-: CPT | Mod: 26

## 2017-09-01 PROCEDURE — 70553 MRI BRAIN STEM W/O & W/DYE: CPT | Mod: 26

## 2017-09-01 RX ORDER — FLUTICASONE PROPIONATE 50 MCG
2 SPRAY, SUSPENSION NASAL DAILY
Qty: 0 | Refills: 0 | Status: DISCONTINUED | OUTPATIENT
Start: 2017-09-01 | End: 2017-09-03

## 2017-09-01 RX ORDER — LACTOBACILLUS ACIDOPHILUS 100MM CELL
1 CAPSULE ORAL DAILY
Qty: 0 | Refills: 0 | Status: DISCONTINUED | OUTPATIENT
Start: 2017-09-01 | End: 2017-09-03

## 2017-09-01 RX ORDER — FESOTERODINE FUMARATE 8 MG/1
0 TABLET, FILM COATED, EXTENDED RELEASE ORAL
Qty: 0 | Refills: 0 | COMMUNITY

## 2017-09-01 RX ORDER — IBUPROFEN 200 MG
600 TABLET ORAL ONCE
Qty: 0 | Refills: 0 | Status: COMPLETED | OUTPATIENT
Start: 2017-09-01 | End: 2017-09-01

## 2017-09-01 RX ORDER — LINACLOTIDE 145 UG/1
290 CAPSULE, GELATIN COATED ORAL DAILY
Qty: 0 | Refills: 0 | Status: DISCONTINUED | OUTPATIENT
Start: 2017-09-01 | End: 2017-09-03

## 2017-09-01 RX ORDER — DIPHENHYDRAMINE HCL 50 MG
25 CAPSULE ORAL ONCE
Qty: 0 | Refills: 0 | Status: COMPLETED | OUTPATIENT
Start: 2017-09-01 | End: 2017-09-01

## 2017-09-01 RX ORDER — OXYCODONE AND ACETAMINOPHEN 5; 325 MG/1; MG/1
2 TABLET ORAL EVERY 4 HOURS
Qty: 0 | Refills: 0 | Status: DISCONTINUED | OUTPATIENT
Start: 2017-09-01 | End: 2017-09-03

## 2017-09-01 RX ORDER — HEPARIN SODIUM 5000 [USP'U]/ML
5000 INJECTION INTRAVENOUS; SUBCUTANEOUS EVERY 12 HOURS
Qty: 0 | Refills: 0 | Status: DISCONTINUED | OUTPATIENT
Start: 2017-09-01 | End: 2017-09-03

## 2017-09-01 RX ORDER — HYDROCHLOROTHIAZIDE 25 MG
25 TABLET ORAL DAILY
Qty: 0 | Refills: 0 | Status: DISCONTINUED | OUTPATIENT
Start: 2017-09-01 | End: 2017-09-03

## 2017-09-01 RX ORDER — HYDROCHLOROTHIAZIDE 25 MG
0 TABLET ORAL
Qty: 0 | Refills: 0 | COMMUNITY

## 2017-09-01 RX ORDER — ACETAMINOPHEN 500 MG
1000 TABLET ORAL ONCE
Qty: 0 | Refills: 0 | Status: COMPLETED | OUTPATIENT
Start: 2017-09-01 | End: 2017-09-01

## 2017-09-01 RX ORDER — DIAZEPAM 5 MG
5 TABLET ORAL ONCE
Qty: 0 | Refills: 0 | Status: DISCONTINUED | OUTPATIENT
Start: 2017-09-01 | End: 2017-09-01

## 2017-09-01 RX ORDER — OXYCODONE HYDROCHLORIDE 5 MG/1
5 TABLET ORAL ONCE
Qty: 0 | Refills: 0 | Status: DISCONTINUED | OUTPATIENT
Start: 2017-09-01 | End: 2017-09-01

## 2017-09-01 RX ORDER — FESOTERODINE FUMARATE 8 MG/1
8 TABLET, FILM COATED, EXTENDED RELEASE ORAL DAILY
Qty: 0 | Refills: 0 | Status: DISCONTINUED | OUTPATIENT
Start: 2017-09-01 | End: 2017-09-03

## 2017-09-01 RX ORDER — ASPIRIN/CALCIUM CARB/MAGNESIUM 324 MG
81 TABLET ORAL DAILY
Qty: 0 | Refills: 0 | Status: DISCONTINUED | OUTPATIENT
Start: 2017-09-01 | End: 2017-09-03

## 2017-09-01 RX ADMIN — Medication 600 MILLIGRAM(S): at 02:12

## 2017-09-01 RX ADMIN — Medication 5 MILLIGRAM(S): at 01:27

## 2017-09-01 RX ADMIN — Medication 25 MILLIGRAM(S): at 21:22

## 2017-09-01 RX ADMIN — OXYCODONE HYDROCHLORIDE 5 MILLIGRAM(S): 5 TABLET ORAL at 07:54

## 2017-09-01 RX ADMIN — Medication 400 MILLIGRAM(S): at 13:23

## 2017-09-01 RX ADMIN — OXYCODONE AND ACETAMINOPHEN 2 TABLET(S): 5; 325 TABLET ORAL at 20:00

## 2017-09-01 RX ADMIN — Medication 81 MILLIGRAM(S): at 08:13

## 2017-09-01 RX ADMIN — OXYCODONE AND ACETAMINOPHEN 2 TABLET(S): 5; 325 TABLET ORAL at 22:06

## 2017-09-01 RX ADMIN — OXYCODONE AND ACETAMINOPHEN 2 TABLET(S): 5; 325 TABLET ORAL at 22:40

## 2017-09-01 RX ADMIN — Medication 2 SPRAY(S): at 21:22

## 2017-09-01 RX ADMIN — Medication 600 MILLIGRAM(S): at 01:27

## 2017-09-01 RX ADMIN — Medication 1 TABLET(S): at 21:22

## 2017-09-01 RX ADMIN — OXYCODONE AND ACETAMINOPHEN 2 TABLET(S): 5; 325 TABLET ORAL at 19:01

## 2017-09-01 RX ADMIN — Medication 30 MILLIGRAM(S): at 21:23

## 2017-09-01 RX ADMIN — Medication 25 MILLIGRAM(S): at 22:06

## 2017-09-01 RX ADMIN — OXYCODONE HYDROCHLORIDE 5 MILLIGRAM(S): 5 TABLET ORAL at 08:45

## 2017-09-01 NOTE — CONSULT NOTE ADULT - ASSESSMENT
Pt has infiltrative process of eyes  no signs of infection on clinical exam  doubt this is bacterial infection   no lalo thrombosis process. discussed with eye  I suspect  it is lung cancer: or another non infectious process    Hold antibiotics   chest xray  neuro follow up   LP      discussed with opthal  thanks

## 2017-09-01 NOTE — CONSULT NOTE ADULT - SUBJECTIVE AND OBJECTIVE BOX
Patient is a 57y old  Female who presents with a chief complaint of   HPI: periorbital swelling and double vision lt eye  seen several weeks ago and given clinda for periorbital cellulitis in er  followed up by opthal and represented with pain behind eye and persist 6 th cran nerve palsy.  no fever or chills  recently lost 100 lbs on diet  long term smoker. mri scan demonstrates bilateral lt greater tahn right hayley orbital w=infiltrative process.       PAST MEDICAL & SURGICAL HISTORY:  Leaky heart valve: pt reports history of &quot;leaky valve&quot;, states it is now no longer there?  Arthritis  Hiatal hernia  Amblyopia of eye, bilateral  Morbidly obese  Bursitis: left arm  Carpal tunnel syndrome, bilateral  Cataract  Sciatica  GERD (gastroesophageal reflux disease)  Sinusitis  Anxiety and depression  OAB (overactive bladder)  Environmental allergies  Seasonal asthma  HTN (hypertension)  S/P arthroscopy of shoulder: left, 9/2014  H/O endoscopy  H/O left breast biopsy: benign, 2005  H/O synovitis: right wrist-1990  H/O unilateral salpingectomy: and unilateral fallopian tube reconstruction-1987      Social history:    FAMILY HISTORY:  Family history of hypertension (Father)  Family history of cardiac pacemaker (Father)  Family history of discoid lupus (Mother)    REVIEW OF SYSTEMS  General:	Denies any malaise fatigue or chills. Fevers absent    Skin:No rash  	  Ophthalmologic:Denies aas per HPI  	  ENMT:No nasal discharge,headache,sinus congestion or throat pain.No dental complaints    Respiratory and Thorax:No cough,sputum or chest pain.Denies shortness of breath  	  Cardiovascular:	No chest pain,palpitaions or dizziness    Gastrointestinal:	NO nausea,abdominal pain or diarrhea.    Genitourinary:	No dysuria,frequency. No flank pain    Musculoskeletal:	No joint swelling or pain.No weakness    Neurological:No confusion,diziness.No extremity weakness.No bladder or bowel incontinence	    Psychiatric:No delusions or hallucinations	    Hematology/Lymphatics:	No LN swelling.No gum bleeding     Endocrine:	No recent weight gain or loss.No abnormal heat/cold intolerance    Allergic/Immunologic:	No hives or rash   Allergies    latex (Pruritus)  No Known Drug Allergies    Intolerances        Antimicrobials:          Vital Signs Last 24 Hrs  T(C): 37.1 (01 Sep 2017 16:01), Max: 37.2 (01 Sep 2017 07:33)  T(F): 98.7 (01 Sep 2017 16:01), Max: 98.9 (01 Sep 2017 07:33)  HR: 64 (01 Sep 2017 16:01) (64 - 68)  BP: 116/70 (01 Sep 2017 16:01) (108/72 - 130/77)  BP(mean): --  RR: 16 (01 Sep 2017 16:01) (16 - 18)  SpO2: 100% (01 Sep 2017 16:01) (97% - 100%)    PHYSICAL EXAM:Pleasant patient in no acute distress.      Constitutional:Comfortable.Awake and alert  No cachexia     Eyes minimal swelling of eye   no conjuct 6 th nerve palsy     ENMT:No sinus tenderness.No thrush.No pharyngeal exudate or erythema.Fair dental hygiene    Neck:Supple,No LN,no JVD      Respiratory:Good air entry bilaterally,CTA    Gastrointestinal:Soft BS(+) no tenderness no masses ,No rebound or guarding    Genitourinary:No CVA tendereness     Rectal:    Extremities:No cyanosis,clubbing or edema.    Vascular:peripheral pulses felt    Neurological:AAO X 3,No grossly focal deficits    Skin:No rash     Lymph Nodes:No palpable LNs    Musculoskeletal:No joint swelling or LOM                                    13.3   8.0   )-----------( 361      ( 31 Aug 2017 12:26 )             40.4         08-31    134<L>  |  97  |  16  ----------------------------<  87  3.9   |  25  |  0.51    Ca    9.4      31 Aug 2017 14:29    TPro  8.6<H>  /  Alb  4.0  /  TBili  0.2  /  DBili  x   /  AST  72<H>  /  ALT  22  /  AlkPhos  43  08-31      RECENT CULTURES:      MICROBIOLOGY:          Radiology:      Assessment:        Recommendations and Plan:    Pager [phone]  After 5 pm/weekends or if no response :[phone]

## 2017-09-01 NOTE — H&P ADULT - FAMILY HISTORY
<<-----Click on this checkbox to enter Family History Family history of discoid lupus     Family history of hypertension     Father  Still living? Unknown  Family history of cardiac pacemaker, Age at diagnosis: Age Unknown

## 2017-09-01 NOTE — ED ADULT NURSE REASSESSMENT NOTE - NURSING NEURO ORIENTATION
oriented to person, place and time

## 2017-09-01 NOTE — ED ADULT NURSE REASSESSMENT NOTE - NS ED NURSE REASSESS COMMENT FT1
Pt stable in no apparent distress.  Pt calm, well appearing, updated on status.
Pt AO4. Neuro check intact except pt complains of headache behind left eye & double vision. No other deficits noted. Safety maintained. Will continue to monitor.
Pt AO4. Neuro check intact except headache behind L eye & diplopia. No other deficits noted. Safety maintained. Will continue to monitor.
1600 Received the patient from  by Stephany Obrien. Pt is awaiting for MRI test . Pt was evaluated by PITER MUNOZ . pt is made oriented to the unit Treatment plan explained to the patient. Pt is made comfortable  safety measures & comfort care provided. PITER Mederos explained and answered all questions pertaining to the patient care to the patient & family.  Pt denies any pain now Pt has dilated pupils due to the eye drops which was provided by the ophthalmologist during the eye eval process. Continue to monitor
Pt received from JOMAR Gabriel. Pt oriented to CDU & plan of care was discussed. Pt AO4. Neuro check intact except pt states she has double vision but can focus her eyes when one is closed or with different positions. No other deficits noted. Safety & comfort measures maintained. Call bell in reach. Will continue to monitor.

## 2017-09-01 NOTE — ED ADULT NURSE REASSESSMENT NOTE - NURSING NEURO LEVEL OF CONSCIOUSNESS
alert and awake/follows commands
follows commands/alert and awake

## 2017-09-01 NOTE — H&P ADULT - HISTORY OF PRESENT ILLNESS
pt is a 57 yr old female w/: periorbital swelling and double vision in  lt eye  seen several weeks ago and given clindamycin  for periorbital cellulitis in er  followed up by opthalmology  and presented with pain behind eye and persist 6 th cranial  nerve palsy.  no fever or chills  recently lost 100 lbs on diet  long term smoker. mri scan demonstrates bilateral lt greater than  right hayley orbital w/ infiltrative process.

## 2017-09-01 NOTE — PROGRESS NOTE ADULT - ASSESSMENT
Assessment  58 yo F, 2 weeks ago had right sided periocular pain, periorbital swelling and CN6 palsy. Now OD swelling resolved, CN6 palsy mostly resolved. But 1 week ago developed the same set of events in the left eye - periocular pain, swelling, eye turning in. Now has b/l CN6 palsy. Also with pain on EOM OS. No fever or white count, orbital cellulitis unlikely. MRI shows preseptal soft tissue swelling on the left as well as retrobulbar fat infiltration and perioptic fat plane infiltration/enhancement left greater than right. 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 Other DDx include IgG4-related ophthalmic disease.    Plan  Trial of 1g IV solumedrol  Antibiotics not required  Please send labs: IgG4, IgE, hypergammaglobulins    D/W Dr Gayle (neuro-ophthalmology) and Dr Melara (oculoplastics)

## 2017-09-01 NOTE — H&P ADULT - NSHPLABSRESULTS_GEN_ALL_CORE
13.3   8.0   )-----------( 361      ( 31 Aug 2017 12:26 )             40.4       08-31    134<L>  |  97  |  16  ----------------------------<  87  3.9   |  25  |  0.51    Ca    9.4      31 Aug 2017 14:29    TPro  8.6<H>  /  Alb  4.0  /  TBili  0.2  /  DBili  x   /  AST  72<H>  /  ALT  22  /  AlkPhos  43  08-31                      Lactate Trend            CAPILLARY BLOOD GLUCOSE

## 2017-09-01 NOTE — H&P ADULT - ASSESSMENT
pt w/ diplopia / eye pain periorbital swelling   ?infection not likely as per id   r/o neoplastic causes  onc eval  ct chest  optho/neuro f/u  cont outpt meds  dvt proph   analgesics  discussed w/ pt and  at bedside

## 2017-09-02 LAB
ACHR BIND AB SER-ACNC: <0.3 NMOL/L — SIGNIFICANT CHANGE UP
ANION GAP SERPL CALC-SCNC: 14 MMOL/L — SIGNIFICANT CHANGE UP (ref 5–17)
BUN SERPL-MCNC: 17 MG/DL — SIGNIFICANT CHANGE UP (ref 7–23)
CALCIUM SERPL-MCNC: 9.9 MG/DL — SIGNIFICANT CHANGE UP (ref 8.4–10.5)
CHLORIDE SERPL-SCNC: 96 MMOL/L — SIGNIFICANT CHANGE UP (ref 96–108)
CO2 SERPL-SCNC: 26 MMOL/L — SIGNIFICANT CHANGE UP (ref 22–31)
CREAT SERPL-MCNC: 0.57 MG/DL — SIGNIFICANT CHANGE UP (ref 0.5–1.3)
GLUCOSE SERPL-MCNC: 115 MG/DL — HIGH (ref 70–99)
IGE SERPL-ACNC: 136 IU/ML — HIGH (ref 0–100)
POTASSIUM SERPL-MCNC: 4 MMOL/L — SIGNIFICANT CHANGE UP (ref 3.5–5.3)
POTASSIUM SERPL-SCNC: 4 MMOL/L — SIGNIFICANT CHANGE UP (ref 3.5–5.3)
SODIUM SERPL-SCNC: 136 MMOL/L — SIGNIFICANT CHANGE UP (ref 135–145)

## 2017-09-02 RX ADMIN — OXYCODONE AND ACETAMINOPHEN 2 TABLET(S): 5; 325 TABLET ORAL at 05:07

## 2017-09-02 RX ADMIN — Medication 1 TABLET(S): at 11:40

## 2017-09-02 RX ADMIN — OXYCODONE AND ACETAMINOPHEN 2 TABLET(S): 5; 325 TABLET ORAL at 09:40

## 2017-09-02 RX ADMIN — Medication 81 MILLIGRAM(S): at 11:40

## 2017-09-02 RX ADMIN — FESOTERODINE FUMARATE 8 MILLIGRAM(S): 8 TABLET, FILM COATED, EXTENDED RELEASE ORAL at 11:40

## 2017-09-02 RX ADMIN — OXYCODONE AND ACETAMINOPHEN 2 TABLET(S): 5; 325 TABLET ORAL at 02:01

## 2017-09-02 RX ADMIN — Medication 30 MILLIGRAM(S): at 21:45

## 2017-09-02 RX ADMIN — OXYCODONE AND ACETAMINOPHEN 2 TABLET(S): 5; 325 TABLET ORAL at 05:39

## 2017-09-02 RX ADMIN — OXYCODONE AND ACETAMINOPHEN 2 TABLET(S): 5; 325 TABLET ORAL at 01:16

## 2017-09-02 RX ADMIN — OXYCODONE AND ACETAMINOPHEN 2 TABLET(S): 5; 325 TABLET ORAL at 10:10

## 2017-09-02 RX ADMIN — Medication 25 MILLIGRAM(S): at 21:45

## 2017-09-02 RX ADMIN — Medication 116 MILLIGRAM(S): at 12:48

## 2017-09-02 NOTE — CONSULT NOTE ADULT - SUBJECTIVE AND OBJECTIVE BOX
Ophthalmology Progress Note    Subjective: No clinical change. Still binocular horizontal diplopia, left periocular swelling stable, mild pain OS on EOM. Did not receive solumedrol yesterday, just received the dose at noon today. Pt was seen as solumedrol is hanging.    HPI: 56 yo F, 2 weeks ago had right sided severe periocular pain, periorbital swelling, proptosis and CN6 palsy, although eye was not injected. Now OD swelling resolved, CN6 palsy mostly resolved. But 5 days ago developed the same set of events in the left eye - periocular pain, swelling, eye turning in. Periocular swelling OD improving. Seen by neuro-ophthalmology prior to admission, Dx with left sided (now b/l) CN6 palsy and sent in for workup. DDx per neuro-ophthalmology: myasthenia (although does not explain periorbital swelling), orbital inflammatory disease, thyroid eye disease, orbital cellulitis, meningitis    Mood and Affect Appropriate ( x 3),  Oriented to Time, Place, and Person x 3     Ophthalmology Exam    Visual acuity (sc): 20/20 OU  Pupils: PERRL OU, no APD  Ttono: 14 OU  Extraocular movements (EOMs): 90% abduction OU, otherwise full OU; binocular and monocular OS horizontal diplopia on lateral gaze only; mild pain on EOM OS  Confrontational Visual Field (CVF):  Full OU  Color Plates: 12/12 OU    Pen Light Exam (PLE)  External:  Flat OD, 1+periorbital edema OS  Lids/Lashes/Lacrimal Ducts: Flat OD, 1+lid edema OS  Sclera/Conjunctiva:  W+Q OU  Cornea: Cl OU  Anterior Chamber: D+F OU  Iris:  Flat OU    Diagnostic Testing:  Afebrile, WBC normal  ESR: 29 -> 34 (mildly elevated)  CRP: 0.6 -> 0.4 (wnl)  IggE: 136 (elevated)  Thyroid panel wnl    CT orbita with contrast:  Redemonstration of left periorbital tissue swelling as well as   questionable fat stranding in the retro-orbital space, left greater than   right, as mentioned in the prior CT orbit study. Consider MR of the   orbits further evaluation.  No acute intracranial hemorrhage, brain edema, mass effect or acute   territorial infarct.     MRI brain, orbit w/wo  Preseptal soft tissue swelling on the left as well as retrobulbar fat infiltration and perioptic fat plane   infiltration/enhancement left greater than right. Inflammatory etiologies   such as orbital cellulitis, granulomatous disease such as sarcoidosis or   pseudotumor are considerations. Neoplastic infiltration such as lymphoma   cannot be excluded.    Assessment and Plan:   · Assessment		  Assessment  56 yo F, 2 weeks ago had right sided periocular pain, periorbital swelling and CN6 palsy. Now OD swelling resolved, CN6 palsy mostly resolved. But 1 week ago developed the same set of events in the left eye - periocular pain, swelling, eye turning in. Now has b/l CN6 palsy. Also with pain on EOM OS. No fever or white count, orbital cellulitis unlikely. MRI shows preseptal soft tissue swelling on the left as well as retrobulbar fat infiltration and perioptic fat plane infiltration/enhancement left greater than right. 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 Other DDx include IgG4-related ophthalmic disease.    Plan  - pt is s/p trial of 1g IV solumedrol (received today 9/2/17 at 1pm), we will follow clinical response tomorrow  - antibiotics not required  - f/u labs: IgG4, IgE (elevated), hypergammaglobulins

## 2017-09-02 NOTE — CONSULT NOTE ADULT - ASSESSMENT
57F with hx axillary lymphadenopathy s/p biopsy with left periorbital edema/pain/diplopia, MRI with infiltrative process    #Left eye CN 6 palsy- with similar symptoms right eye 2 weeks prior with those symptoms mostly resolved; MRI with fat infiltration  - Opthalmology and neurology following; s/p solumedrol  - per Optho, most likely pseudotumor; MRI cannot exclude lymphoma- no evidence of other CNS lesion; would follow with Optho for further evaluation and to determine if biopsy will be required  - no evidence of systemic lymphoma on eval thus far    #axillary lymphadenopathy- reports has been present, seen on mammogram, s/p biopsy approx 6 months prior was benign; also with benign appearance on CT; no symptoms to suggest systemic lymphoma  - due for mammogram in october    recommended following as outpatient to review previous imaging and biopsy results  DC plan per Medicine    d/w NP

## 2017-09-02 NOTE — PROGRESS NOTE ADULT - ASSESSMENT
pt w/ diplopia / eye pain periorbital swelling   ?infection not likely as per id   r/o neoplastic causes  onc eval today   ct chest neg / only axillary nodes and pt states got biopsy 6 months ago   optho/neuro f/u  cont outpt meds  dvt proph   analgesics  discussed w/ pt and  at bedside   d/c if ok w. all specialists w/ outpt f/u

## 2017-09-02 NOTE — CONSULT NOTE ADULT - SUBJECTIVE AND OBJECTIVE BOX
Patient is a 57y old  Female who presents with a chief complaint of swollen eyes (01 Sep 2017 19:27)      HPI: The pt is a 57F with left eye pain/swelling/double vision. Pt had similar symptoms in right eye 2 weeks prior, was seen in ED, diagnosed with CN 6 palsy and followed by Opthalmology as outpatient. given clindamycin for cellulitis. Pt reports left eye pain is controlled on medications, double vision and swelling slightly less. Pt denies fever or chills. Pt was in usual state of health prior to these symptoms. appetite/weight stable, lost more than 100lb intentionally. + tob. no night sweats. energy stable.  with known axillary lymph nodes, s/p biopsy approx 6 months prior- benign    PAST MEDICAL & SURGICAL HISTORY:  Leaky heart valve  Arthritis  Hiatal hernia  Amblyopia of eye, bilateral  Morbidly obese- s/p weight loss 135 lb  lymph node biopsy Right axilla approx 6 months prior- benign  Bursitis: left arm  Carpal tunnel syndrome, bilateral  Cataract  Sciatica  GERD (gastroesophageal reflux disease)  Sinusitis  Anxiety and depression  OAB (overactive bladder)  Environmental allergies  Seasonal asthma  HTN (hypertension)  S/P arthroscopy of shoulder: left, 9/2014  H/O endoscopy  H/O left breast biopsy: benign, 2005  H/O synovitis: right wrist-1990  H/O unilateral salpingectomy: and unilateral fallopian tube reconstruction-1987      SOCIAL HISTORY:  Smoking - +TOB   Alcohol - Social  Drugs - No drug use  works for [employer] department    FAMILY HISTORY:  Family history of hypertension  Family history of cardiac pacemaker (Father)  Family history of lupus -Mother  Crohn's, UC    MEDICATIONS  (STANDING):  aspirin enteric coated 81 milliGRAM(s) Oral daily  heparin  Injectable 5000 Unit(s) SubCutaneous every 12 hours  busPIRone 30 milliGRAM(s) Oral at bedtime  hydrochlorothiazide 25 milliGRAM(s) Oral daily  fluticasone propionate 50 MICROgram(s)/spray Nasal Spray 2 Spray(s) Both Nostrils daily  linaclotide 290 MICROGram(s) Oral daily  fesoterodine ER Tablet 8 milliGRAM(s) Oral daily  lactobacillus acidophilus 1 Tablet(s) Oral daily  methylPREDNISolone sodium succinate IVPB 1000 milliGRAM(s) IV Intermittent once    MEDICATIONS  (PRN):  oxyCODONE    5 mG/acetaminophen 325 mG 2 Tablet(s) Oral every 4 hours PRN Severe Pain (7 - 10)      Allergies    latex (Pruritus)  No Known Drug Allergies    ROS:  gen- weight stable, energy/appetite stable  heent- eye pain/swelling/double vision per HPI; no uri sx  cv- no chest pain, no palpitation  resp- no dyspnea, no cough, no nichole  gi- no n/v/abd pain, sl constipation, no BRBPR/melena  gu- no hematuria  ext- no leg swelling/pain  ROS otherwise negative      Vital Signs Last 24 Hrs  T(C): 36.8 (02 Sep 2017 11:36), Max: 37.1 (01 Sep 2017 16:01)  T(F): 98.3 (02 Sep 2017 11:36), Max: 98.7 (01 Sep 2017 16:01)  HR: 983 (02 Sep 2017 11:36) (64 - 983)  BP: 110/76 (02 Sep 2017 11:36) (104/64 - 145/76)  BP(mean): --  RR: 18 (02 Sep 2017 11:36) (16 - 18)  SpO2: 96% (02 Sep 2017 11:36) (96% - 100%)    PHYSICAL EXAM  General: adult in NAD  HEENT: clear oropharynx, anicteric sclera, pink conjunctiva, +left periorbital edema  Neck: supple  CV: normal S1/S2 with no murmur rubs or gallops  Lungs: clear to auscultation, no wheezes, no rales  Abdomen: soft non-tender non-distended, no hepatosplenomegaly, positive bowel sounds  Ext: no clubbing cyanosis or edema  Skin: no rashes and no petechiae  Lymph Nodes: No LAD in axillae, groin, neck  Neuro: alert and oriented X 3, no focal deficits    LABS:            Serial CBC's  08-31 @ 12:26  Hct-40.4 / Hgb-13.3 / Plat-361 / RBC-4.09 / WBC-8.0      08-31    134<L>  |  97  |  16  ----------------------------<  87  3.9   |  25  |  0.51    Ca    9.4      31 Aug 2017 14:29        Radiology:  < from: CT Chest No Cont (09.01.17 @ 18:27) >  IMPRESSION:   Clear lungs. Moderate hiatal hernia.    Nonspecific, increased number of largely small bilateral axillary lymph   nodes. The largest of these contain abundant fatty mallika, in keeping with   benign morphology.      < from: MRI Orbit, Face, and/or Neck w/wo Cont, Bilat (09.01.17 @ 03:26) >  IMPRESSION:  Preseptal soft tissue swelling on the left as well as   retrobulbar fat infiltration and perioptic fat plane   infiltration/enhancement left greater than right. Inflammatory etiologies   such as orbital cellulitis, granulomatous disease such as sarcoidosis or   pseudotumor are considerations. Neoplastic infiltration such as lymphoma   cannot be excluded.

## 2017-09-03 ENCOUNTER — TRANSCRIPTION ENCOUNTER (OUTPATIENT)
Age: 57
End: 2017-09-03

## 2017-09-03 VITALS
DIASTOLIC BLOOD PRESSURE: 78 MMHG | OXYGEN SATURATION: 97 % | SYSTOLIC BLOOD PRESSURE: 131 MMHG | HEART RATE: 83 BPM | TEMPERATURE: 98 F | RESPIRATION RATE: 18 BRPM

## 2017-09-03 LAB
ANION GAP SERPL CALC-SCNC: 16 MMOL/L — SIGNIFICANT CHANGE UP (ref 5–17)
BUN SERPL-MCNC: 18 MG/DL — SIGNIFICANT CHANGE UP (ref 7–23)
CALCIUM SERPL-MCNC: 10.7 MG/DL — HIGH (ref 8.4–10.5)
CHLORIDE SERPL-SCNC: 97 MMOL/L — SIGNIFICANT CHANGE UP (ref 96–108)
CO2 SERPL-SCNC: 25 MMOL/L — SIGNIFICANT CHANGE UP (ref 22–31)
CREAT SERPL-MCNC: 0.62 MG/DL — SIGNIFICANT CHANGE UP (ref 0.5–1.3)
GLUCOSE SERPL-MCNC: 120 MG/DL — HIGH (ref 70–99)
HCT VFR BLD CALC: 40.1 % — SIGNIFICANT CHANGE UP (ref 34.5–45)
HGB BLD-MCNC: 13.5 G/DL — SIGNIFICANT CHANGE UP (ref 11.5–15.5)
MCHC RBC-ENTMCNC: 31.7 PG — SIGNIFICANT CHANGE UP (ref 27–34)
MCHC RBC-ENTMCNC: 33.7 GM/DL — SIGNIFICANT CHANGE UP (ref 32–36)
MCV RBC AUTO: 94.1 FL — SIGNIFICANT CHANGE UP (ref 80–100)
PLATELET # BLD AUTO: 384 K/UL — SIGNIFICANT CHANGE UP (ref 150–400)
POTASSIUM SERPL-MCNC: 4 MMOL/L — SIGNIFICANT CHANGE UP (ref 3.5–5.3)
POTASSIUM SERPL-SCNC: 4 MMOL/L — SIGNIFICANT CHANGE UP (ref 3.5–5.3)
RBC # BLD: 4.26 M/UL — SIGNIFICANT CHANGE UP (ref 3.8–5.2)
RBC # FLD: 14.3 % — SIGNIFICANT CHANGE UP (ref 10.3–14.5)
SODIUM SERPL-SCNC: 138 MMOL/L — SIGNIFICANT CHANGE UP (ref 135–145)
WBC # BLD: 9.62 K/UL — SIGNIFICANT CHANGE UP (ref 3.8–10.5)
WBC # FLD AUTO: 9.62 K/UL — SIGNIFICANT CHANGE UP (ref 3.8–10.5)

## 2017-09-03 PROCEDURE — 84443 ASSAY THYROID STIM HORMONE: CPT

## 2017-09-03 PROCEDURE — 84295 ASSAY OF SERUM SODIUM: CPT

## 2017-09-03 PROCEDURE — 83605 ASSAY OF LACTIC ACID: CPT

## 2017-09-03 PROCEDURE — 82435 ASSAY OF BLOOD CHLORIDE: CPT

## 2017-09-03 PROCEDURE — 82330 ASSAY OF CALCIUM: CPT

## 2017-09-03 PROCEDURE — 85014 HEMATOCRIT: CPT

## 2017-09-03 PROCEDURE — 84132 ASSAY OF SERUM POTASSIUM: CPT

## 2017-09-03 PROCEDURE — 86043 ACETYLCHOLN RCPTR MODLG ANTB: CPT

## 2017-09-03 PROCEDURE — 70553 MRI BRAIN STEM W/O & W/DYE: CPT

## 2017-09-03 PROCEDURE — 71250 CT THORAX DX C-: CPT

## 2017-09-03 PROCEDURE — 96374 THER/PROPH/DIAG INJ IV PUSH: CPT

## 2017-09-03 PROCEDURE — 70543 MRI ORBT/FAC/NCK W/O &W/DYE: CPT

## 2017-09-03 PROCEDURE — 82785 ASSAY OF IGE: CPT

## 2017-09-03 PROCEDURE — A9585: CPT

## 2017-09-03 PROCEDURE — 70450 CT HEAD/BRAIN W/O DYE: CPT

## 2017-09-03 PROCEDURE — 80048 BASIC METABOLIC PNL TOTAL CA: CPT

## 2017-09-03 PROCEDURE — 82947 ASSAY GLUCOSE BLOOD QUANT: CPT

## 2017-09-03 PROCEDURE — 86140 C-REACTIVE PROTEIN: CPT

## 2017-09-03 PROCEDURE — 84480 ASSAY TRIIODOTHYRONINE (T3): CPT

## 2017-09-03 PROCEDURE — 82565 ASSAY OF CREATININE: CPT

## 2017-09-03 PROCEDURE — 86042 ACETYLCHOLN RCPTR BLCKG ANTB: CPT

## 2017-09-03 PROCEDURE — 85027 COMPLETE CBC AUTOMATED: CPT

## 2017-09-03 PROCEDURE — 86366 MUSCLE-SPECIFIC KINASE ANTB: CPT

## 2017-09-03 PROCEDURE — 96376 TX/PRO/DX INJ SAME DRUG ADON: CPT

## 2017-09-03 PROCEDURE — G0378: CPT

## 2017-09-03 PROCEDURE — 85652 RBC SED RATE AUTOMATED: CPT

## 2017-09-03 PROCEDURE — 99285 EMERGENCY DEPT VISIT HI MDM: CPT | Mod: 25

## 2017-09-03 PROCEDURE — 70480 CT ORBIT/EAR/FOSSA W/O DYE: CPT

## 2017-09-03 PROCEDURE — 94640 AIRWAY INHALATION TREATMENT: CPT

## 2017-09-03 PROCEDURE — 82787 IGG 1 2 3 OR 4 EACH: CPT

## 2017-09-03 PROCEDURE — 80053 COMPREHEN METABOLIC PANEL: CPT

## 2017-09-03 PROCEDURE — 84238 ASSAY NONENDOCRINE RECEPTOR: CPT

## 2017-09-03 PROCEDURE — 82803 BLOOD GASES ANY COMBINATION: CPT

## 2017-09-03 PROCEDURE — 84436 ASSAY OF TOTAL THYROXINE: CPT

## 2017-09-03 RX ORDER — ASPIRIN/CALCIUM CARB/MAGNESIUM 324 MG
1 TABLET ORAL
Qty: 30 | Refills: 0 | OUTPATIENT
Start: 2017-09-03 | End: 2017-10-03

## 2017-09-03 RX ADMIN — OXYCODONE AND ACETAMINOPHEN 2 TABLET(S): 5; 325 TABLET ORAL at 00:24

## 2017-09-03 RX ADMIN — OXYCODONE AND ACETAMINOPHEN 2 TABLET(S): 5; 325 TABLET ORAL at 02:30

## 2017-09-03 RX ADMIN — Medication 81 MILLIGRAM(S): at 12:21

## 2017-09-03 RX ADMIN — LINACLOTIDE 290 MICROGRAM(S): 145 CAPSULE, GELATIN COATED ORAL at 05:43

## 2017-09-03 RX ADMIN — Medication 1 TABLET(S): at 12:21

## 2017-09-03 RX ADMIN — Medication 2 SPRAY(S): at 12:20

## 2017-09-03 RX ADMIN — FESOTERODINE FUMARATE 8 MILLIGRAM(S): 8 TABLET, FILM COATED, EXTENDED RELEASE ORAL at 12:21

## 2017-09-03 NOTE — DISCHARGE NOTE ADULT - MEDICATION SUMMARY - MEDICATIONS TO TAKE
I will START or STAY ON the medications listed below when I get home from the hospital:    Saline Nasal Rinse  --  in each nostril 2 times a day  -- Indication: For Nasal     predniSONE 20 mg oral tablet  -- 3 tab(s) by mouth once a day  -- It is very important that you take or use this exactly as directed.  Do not skip doses or discontinue unless directed by your doctor.  Obtain medical advice before taking any non-prescription drugs as some may affect the action of this medication.  Take with food or milk.    -- Indication: For Eye pain, left    aspirin 81 mg oral delayed release tablet  -- 1 tab(s) by mouth once a day  -- Indication: For Eye pain, left    ibuprofen 200 mg oral tablet  -- 1 tab(s) by mouth , As Needed  -- Indication: For Pain of left eye    acetaminophen 650 mg oral tablet  -- 2 tab(s) by mouth every 3 to 6 hours, As Needed  -- Indication: For Eye pain, left    diphenhydrAMINE 25 mg oral tablet  -- 1 tab(s) by mouth once (at bedtime), As Needed  -- Indication: For CNS    busPIRone 15 mg oral tablet  -- 2 tab(s) by mouth once a day (at bedtime)  -- Indication: For mood disorder    hydroCHLOROthiazide 25 mg oral tablet  -- 1 tab(s) by mouth once a day  -- Indication: For Essential hypertension    Linzess 290 mcg oral capsule  -- 1 cap(s) by mouth once a day  -- Indication: For constipation     Flonase 50 mcg/inh nasal spray  -- 1 spray(s) in each nostril 2 times a day  -- Indication: For allergy     Probiotic Formula oral capsule  -- 1 cap(s) by mouth once a day (at bedtime)  -- Indication: For supplement     Toviaz 8 mg oral tablet, extended release  -- 1 tab(s) by mouth once a day  -- Indication: For urinary

## 2017-09-03 NOTE — CONSULT NOTE ADULT - SUBJECTIVE AND OBJECTIVE BOX
Ophthalmology Progress Note    Subjective: Reports improvement in horizontal diplopia, left periocular swelling stable. No pain with EOM. S/p solumedrol yesterday    HPI: 56 yo F, 2 weeks ago had right sided severe periocular pain, periorbital swelling, proptosis and CN6 palsy, although eye was not injected. Now OD swelling resolved, CN6 palsy mostly resolved. But 5 days ago developed the same set of events in the left eye - periocular pain, swelling, eye turning in. Periocular swelling OD improving. Seen by neuro-ophthalmology prior to admission, Dx with left sided (now b/l) CN6 palsy and sent in for workup. DDx per neuro-ophthalmology: myasthenia (although does not explain periorbital swelling), orbital inflammatory disease, thyroid eye disease, orbital cellulitis, meningitis    Mood and Affect Appropriate ( x 3),  Oriented to Time, Place, and Person x 3     Ophthalmology Exam    Visual acuity (sc): 20/20 OU  Pupils: PERRL OU, no APD  Ttono: 18 OU  Extraocular movements (EOMs): 95% abduction OU, otherwise full OU; binocular and monocular OS horizontal diplopia on lateral gaze only improved from yesterday  Confrontational Visual Field (CVF):  Full OU  Color Plates: 12/12 OU    Pen Light Exam (PLE)  External:  Flat OD, 1+periorbital edema OS  Lids/Lashes/Lacrimal Ducts: Flat OD, 1+lid edema OS  Sclera/Conjunctiva:  W+Q OU  Cornea: Cl OU  Anterior Chamber: D+F OU  Iris:  Flat OU    Diagnostic Testing:  Afebrile, WBC normal  ESR: 29 -> 34 (mildly elevated)  CRP: 0.6 -> 0.4 (wnl)  IggE: 136 (elevated)  Thyroid panel wnl    CT orbita with contrast:  Redemonstration of left periorbital tissue swelling as well as   questionable fat stranding in the retro-orbital space, left greater than   right, as mentioned in the prior CT orbit study. Consider MR of the   orbits further evaluation.  No acute intracranial hemorrhage, brain edema, mass effect or acute   territorial infarct.     MRI brain, orbit w/wo  Preseptal soft tissue swelling on the left as well as retrobulbar fat infiltration and perioptic fat plane   infiltration/enhancement left greater than right. Inflammatory etiologies   such as orbital cellulitis, granulomatous disease such as sarcoidosis or   pseudotumor are considerations. Neoplastic infiltration such as lymphoma   cannot be excluded.      Assessment and Plan:   		    56 yo F, 2 weeks ago had right sided periocular pain, periorbital swelling and CN6 palsy. Now OD swelling resolved, CN6 palsy mostly resolved. But 1 week ago developed the same set of events in the left eye - periocular pain, swelling, eye turning in. Now has b/l CN6 palsy. Also with pain on EOM OS. No fever or white count, orbital cellulitis unlikely. MRI shows preseptal soft tissue swelling on the left as well as retrobulbar fat infiltration and perioptic fat plane infiltration/enhancement left greater than right. 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 Other DDx include IgG4-related ophthalmic disease.   - Pt is s/p trial of 1g IV solumedrol (received today 9/2/17 at 1pm), today with improvement in EOM and diplopia  - Please start patient on oral prednisone daily, 1mg/kg daily dosing.   - Pt may be discharged from ophtho stand point. Pt will need to follow up with Dr. Gayle next week. Pt will need to call to make an appointment (see below for follow up info)  - antibiotics not required  - f/u labs: IgG4, IgE (elevated), hypergammaglobulins    Case discussed with Dr. Gayle (neuro-ophthalmology)    Patient should follow up with neuro-ophthalmology (Dr Gayle) next week, please call to make appt  [address]  [place] [address]  [phone]

## 2017-09-03 NOTE — DISCHARGE NOTE ADULT - PATIENT PORTAL LINK FT
“You can access the Follow[facility] Patient Portal, offered by [facility], by registering with the following website: [email or web address]”

## 2017-09-03 NOTE — DISCHARGE NOTE ADULT - ADDITIONAL INSTRUCTIONS
follow up with Opthalmology - Please call this Tuesday to make an appointment follow up with Opthalmology - Please call this Tuesday to make an appointment  follow up with Neurology

## 2017-09-03 NOTE — DISCHARGE NOTE ADULT - CARE PLAN
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 Brown and call to make an appointment on Tuesday  Secondary Diagnosis:	Essential hypertension  Instructions for follow-up, activity and diet:	Low salt diet  Activity as tolerated.  Take all medication as prescribed.  Follow up with your medical doctor for routine blood pressure monitoring at your next visit.  Notify your doctor if you have any of the following symptoms:   Dizziness, Lightheadedness, Blurry vision, Headache, Chest pain, Shortness of breath  Secondary Diagnosis:	Gastroesophageal reflux disease, esophagitis presence not specified  Instructions for follow-up, activity and diet:	Take medication as recommended  Acid reflux is when the acid that is normally in your stomach backs up into the esophagus, tube that carries food from your mouth to your stomach [place] as"gastroesophageal reflux disease," or GERD.   The symptoms include - Burning in the chest, known as heartburn, burning in the throat or an acid taste in the throat, stomach or chest pain, trouble swallowing, unexplained cough   Measures to follow to avoid symptoms are lose weight (if you are overweight), raise the head of your bed by 6 to 8 inches, avoid foods that make your symptoms worse like coffee, chocolate, alcohol, fatty foods.  Seek medical if your symptoms are severe or last a long time.   Seek immediate help for trouble swallowing, feel as though food gets "stuck" on the way down, choke when you eat, vomit blood, dark or black stool, chest pain  Secondary Diagnosis:	Depression, unspecified depression type  Instructions for follow-up, activity and diet:	SEEK IMMEDIATE CARE IF  You have thoughts about hurting yourself or others.  You lose touch with reality (have psychotic symptoms).  You are taking medicine for depression and have a serious side effect

## 2017-09-03 NOTE — PROGRESS NOTE ADULT - SUBJECTIVE AND OBJECTIVE BOX
Ophthalmology Progress Note    No clinical change. Still binocular horizontal diplopia, left periocular swelling, mild pain on EOM.    HPI: 56 yo F, 2 weeks ago had right sided severe periocular pain, periorbital swelling, proptosis and CN6 palsy, although eye was not injected. Now OD swelling resolved, CN6 palsy mostly resolved. But 5 days ago developed the same set of events in the left eye - periocular pain, swelling, eye turning in. Periocular swelling OD improving. Seen by neuro-ophthalmology today, Dx with left sided (now b/l) CN6 palsy and sent in for workup. DDx per neuro-ophthalmology today: myasthenia (although does not explain periorbital swelling), orbital inflammatory disease, thyroid eye disease, orbital cellulitis, meningitis  Mood and Affect Appropriate ( x ),  Oriented to Time, Place, and Person x 3 ( x )    Ophthalmology Exam    Visual acuity (sc): 20/20 OU  Pupils: PERRL OU, no APD  Ttono: 16 OU  Extraocular movements (EOMs): 85% abduction OU, otherwise full OU, binocular horizontal diplopia increased on lateral gaze, mild pain on EOM OS  Confrontational Visual Field (CVF):  Full OU  Color Plates: 12/12 OU    Pen Light Exam (PLE)  External:  Flat OD, 1+periorbital edema OS  Lids/Lashes/Lacrimal Ducts: Flat OD, 1+lid edema OS  Sclera/Conjunctiva:  W+Q OU  Cornea: Cl OU  Anterior Chamber: D+F OU  Iris:  Flat OU    Diagnostic Testing:    Afebrile, WCC normal    ESR and CRP borderline elevated    CT orbita with contrast:  Redemonstration of left periorbital tissue swelling as well as   questionable fat stranding in the retro-orbital space, left greater than   right, as mentioned in the prior CT orbit study. Consider MR of the   orbits further evaluation.  No acute intracranial hemorrhage, brain edema, mass effect or acute   territorial infarct.     MRI brain, orbit w/wo  Preseptal soft tissue swelling on the left as well as retrobulbar fat infiltration and perioptic fat plane   infiltration/enhancement left greater than right. Inflammatory etiologies   such as orbital cellulitis, granulomatous disease such as sarcoidosis or   pseudotumor are considerations. Neoplastic infiltration such as lymphoma   cannot be excluded.
CHIEF COMPLAINT:Patient is a 57y old  Female who presents with a chief complaint of swollen eyes (01 Sep 2017 19:27)    	no new complaints         PAST MEDICAL & SURGICAL HISTORY:  Leaky heart valve: pt reports history of &quot;leaky valve&quot;, states it is now no longer there?  Arthritis  Hiatal hernia  Amblyopia of eye, bilateral  Morbidly obese  Bursitis: left arm  Carpal tunnel syndrome, bilateral  Cataract  Sciatica  GERD (gastroesophageal reflux disease)  Sinusitis  Anxiety and depression  OAB (overactive bladder)  Environmental allergies  Seasonal asthma  HTN (hypertension)  S/P arthroscopy of shoulder: left, 9/2014  H/O endoscopy  H/O left breast biopsy: benign, 2005  H/O synovitis: right wrist-1990  H/O unilateral salpingectomy: and unilateral fallopian tube reconstruction-1987          REVIEW OF SYSTEMS:  CONSTITUTIONAL: No fever, weight loss, or fatigue  EYES: No eye pain, visual disturbances, or discharge  NECK: No pain or stiffness  RESPIRATORY: No cough, wheezing, chills or hemoptysis; No Shortness of Breath  CARDIOVASCULAR: No chest pain, palpitations, passing out, dizziness, or leg swelling  GASTROINTESTINAL: No abdominal or epigastric pain. No nausea, vomiting, or hematemesis; No diarrhea or constipation. No melena or hematochezia.  GENITOURINARY: No dysuria, frequency, hematuria, or incontinence  NEUROLOGICAL: No headaches, memory loss, loss of strength, numbness, or tremors  SKIN: No itching, burning, rashes, or lesions   LYMPH Nodes: No enlarged glands  ENDOCRINE: No heat or cold intolerance; No hair loss  MUSCULOSKELETAL: No joint pain or swelling; No muscle, back, or extremity pain    Medications:  MEDICATIONS  (STANDING):  aspirin enteric coated 81 milliGRAM(s) Oral daily  heparin  Injectable 5000 Unit(s) SubCutaneous every 12 hours  busPIRone 30 milliGRAM(s) Oral at bedtime  hydrochlorothiazide 25 milliGRAM(s) Oral daily  fluticasone propionate 50 MICROgram(s)/spray Nasal Spray 2 Spray(s) Both Nostrils daily  linaclotide 290 MICROGram(s) Oral daily  fesoterodine ER Tablet 8 milliGRAM(s) Oral daily  lactobacillus acidophilus 1 Tablet(s) Oral daily    MEDICATIONS  (PRN):  oxyCODONE    5 mG/acetaminophen 325 mG 2 Tablet(s) Oral every 4 hours PRN Severe Pain (7 - 10)    	    PHYSICAL EXAM:  T(C): 36.7 (09-03-17 @ 08:11), Max: 36.8 (09-02-17 @ 15:55)  HR: 71 (09-03-17 @ 08:11) (69 - 71)  BP: 115/77 (09-03-17 @ 08:11) (114/76 - 117/67)  RR: 18 (09-03-17 @ 08:11) (18 - 18)  SpO2: 100% (09-03-17 @ 08:11) (96% - 100%)  Wt(kg): --  I&O's Summary    02 Sep 2017 07:01  -  03 Sep 2017 07:00  --------------------------------------------------------  IN: 500 mL / OUT: 0 mL / NET: 500 mL        Appearance: Normal	  HEENT:   Normal oral mucosa, PERRL, EOMI, no periorbital swelling	  Lymphatic: No lymphadenopathy  Cardiovascular: Normal S1 S2, No JVD, No murmurs, No edema  Respiratory: Lungs clear to auscultation	  Psychiatry: A & O x 3, Mood & affect appropriate  Gastrointestinal:  Soft, Non-tender, + BS	  Skin: No rashes, No ecchymoses, No cyanosis	  Neurologic: Non-focal  Extremities: Normal range of motion, No clubbing, cyanosis or edema  Vascular: Peripheral pulses palpable 2+ bilaterally    	  LABS:	 	    CARDIAC MARKERS:                                13.5   9.62  )-----------( 384      ( 03 Sep 2017 09:03 )             40.1     09-03    138  |  97  |  18  ----------------------------<  120<H>  4.0   |  25  |  0.62    Ca    10.7<H>      03 Sep 2017 08:48      proBNP:   Lipid Profile:   HgA1c:   TSH:
CHIEF COMPLAINT:Patient is a 57y old  Female who presents with a chief complaint of swollen eyes (01 Sep 2017 19:27)    	no new complaints         PAST MEDICAL & SURGICAL HISTORY:  Leaky heart valve: pt reports history of &quot;leaky valve&quot;, states it is now no longer there?  Arthritis  Hiatal hernia  Amblyopia of eye, bilateral  Morbidly obese  Bursitis: left arm  Carpal tunnel syndrome, bilateral  Cataract  Sciatica  GERD (gastroesophageal reflux disease)  Sinusitis  Anxiety and depression  OAB (overactive bladder)  Environmental allergies  Seasonal asthma  HTN (hypertension)  S/P arthroscopy of shoulder: left, 9/2014  H/O endoscopy  H/O left breast biopsy: benign, 2005  H/O synovitis: right wrist-1990  H/O unilateral salpingectomy: and unilateral fallopian tube reconstruction-1987          REVIEW OF SYSTEMS:  CONSTITUTIONAL: No fever, weight loss, or fatigue  EYES: No eye pain, visual disturbances, or discharge  NECK: No pain or stiffness  RESPIRATORY: No cough, wheezing, chills or hemoptysis; No Shortness of Breath  CARDIOVASCULAR: No chest pain, palpitations, passing out, dizziness, or leg swelling  GASTROINTESTINAL: No abdominal or epigastric pain. No nausea, vomiting, or hematemesis; No diarrhea or constipation. No melena or hematochezia.  GENITOURINARY: No dysuria, frequency, hematuria, or incontinence  NEUROLOGICAL: No headaches, memory loss, loss of strength, numbness, or tremors  SKIN: No itching, burning, rashes, or lesions   LYMPH Nodes: No enlarged glands  ENDOCRINE: No heat or cold intolerance; No hair loss  MUSCULOSKELETAL: No joint pain or swelling; No muscle, back, or extremity pain    Medications:  MEDICATIONS  (STANDING):  aspirin enteric coated 81 milliGRAM(s) Oral daily  heparin  Injectable 5000 Unit(s) SubCutaneous every 12 hours  busPIRone 30 milliGRAM(s) Oral at bedtime  hydrochlorothiazide 25 milliGRAM(s) Oral daily  fluticasone propionate 50 MICROgram(s)/spray Nasal Spray 2 Spray(s) Both Nostrils daily  linaclotide 290 MICROGram(s) Oral daily  fesoterodine ER Tablet 8 milliGRAM(s) Oral daily  lactobacillus acidophilus 1 Tablet(s) Oral daily    MEDICATIONS  (PRN):  oxyCODONE    5 mG/acetaminophen 325 mG 2 Tablet(s) Oral every 4 hours PRN Severe Pain (7 - 10)    	    PHYSICAL EXAM:  T(C): 36.8 (09-02-17 @ 07:24), Max: 37.1 (09-01-17 @ 12:25)  HR: 64 (09-02-17 @ 07:24) (64 - 84)  BP: 104/64 (09-02-17 @ 07:24) (104/64 - 145/76)  RR: 18 (09-02-17 @ 07:24) (16 - 18)  SpO2: 99% (09-02-17 @ 07:24) (98% - 100%)  Wt(kg): --  I&O's Summary    01 Sep 2017 07:01  -  02 Sep 2017 07:00  --------------------------------------------------------  IN: 120 mL / OUT: 0 mL / NET: 120 mL        Appearance: Normal	  HEENT:   Normal oral mucosa, PERRL, EOMI	  Lymphatic: No lymphadenopathy  Cardiovascular: Normal S1 S2, No JVD, No murmurs, No edema  Respiratory: Lungs clear to auscultation	  Psychiatry: A & O x 3, Mood & affect appropriate  Gastrointestinal:  Soft, Non-tender, + BS	  Skin: No rashes, No ecchymoses, No cyanosis	  Neurologic: Non-focal  Extremities: Normal range of motion, No clubbing, cyanosis or edema  Vascular: Peripheral pulses palpable 2+ bilaterally    TELEMETRY: 	    ECG:  	  RADIOLOGY:  OTHER: 	  	  LABS:	 	    CARDIAC MARKERS:                                13.3   8.0   )-----------( 361      ( 31 Aug 2017 12:26 )             40.4     08-31    134<L>  |  97  |  16  ----------------------------<  87  3.9   |  25  |  0.51    Ca    9.4      31 Aug 2017 14:29    TPro  8.6<H>  /  Alb  4.0  /  TBili  0.2  /  DBili  x   /  AST  72<H>  /  ALT  22  /  AlkPhos  43  08-31    proBNP:   Lipid Profile:   HgA1c:   TSH:

## 2017-09-03 NOTE — DISCHARGE NOTE ADULT - CARE PROVIDERS DIRECT ADDRESSES
,[email or web address]

## 2017-09-03 NOTE — DISCHARGE NOTE ADULT - PLAN OF CARE
L 6th nerve palsy vs  idiopathic orbital inflammatory syndrome (aka orbital pseudotumor) as per opthalmology  and follow up with Neurology   Please follow up with Dr. Brown and call to make an appointment on Tuesday Low salt diet  Activity as tolerated.  Take all medication as prescribed.  Follow up with your medical doctor for routine blood pressure monitoring at your next visit.  Notify your doctor if you have any of the following symptoms:   Dizziness, Lightheadedness, Blurry vision, Headache, Chest pain, Shortness of breath Take medication as recommended  Acid reflux is when the acid that is normally in your stomach backs up into the esophagus, tube that carries food from your mouth to your stomach [place] as"gastroesophageal reflux disease," or GERD.   The symptoms include - Burning in the chest, known as heartburn, burning in the throat or an acid taste in the throat, stomach or chest pain, trouble swallowing, unexplained cough   Measures to follow to avoid symptoms are lose weight (if you are overweight), raise the head of your bed by 6 to 8 inches, avoid foods that make your symptoms worse like coffee, chocolate, alcohol, fatty foods.  Seek medical if your symptoms are severe or last a long time.   Seek immediate help for trouble swallowing, feel as though food gets "stuck" on the way down, choke when you eat, vomit blood, dark or black stool, chest pain symptoms improved with steroids SEEK IMMEDIATE CARE IF  You have thoughts about hurting yourself or others.  You lose touch with reality (have psychotic symptoms).  You are taking medicine for depression and have a serious side effect

## 2017-09-03 NOTE — PROGRESS NOTE ADULT - ASSESSMENT
58 yo F as above:  1. Periorbital swelling - now resolved  2. CN6 palsy - significantly improved w/steroids, c/w PO Prednisone 60 daily, d/c home w/outpt Ophtho f/u, d/c home  3. HTN - BP at goal  4. DVT prophylaxis

## 2017-09-03 NOTE — DISCHARGE NOTE ADULT - MEDICATION SUMMARY - MEDICATIONS TO STOP TAKING
I will STOP taking the medications listed below when I get home from the hospital:    clindamycin 150 mg oral capsule  -- 3 cap(s) by mouth every 8 hours  -- Finish all this medication unless otherwise directed by prescriber.  Medication should be taken with plenty of water.    naproxen sodium 220 mg oral tablet  -- 1 tab(s) by mouth , As Needed

## 2017-09-03 NOTE — DISCHARGE NOTE ADULT - HOSPITAL COURSE
to be completed by attending 57 yr old female w/: periorbital swelling and double vision in  lt eye  seen several weeks ago and given clindamycin  for periorbital cellulitis in er  followed up by opthalmology  and presented with pain behind eye and persist 6 th cranial  nerve palsy.  no fever or chills  recently lost 100 lbs on diet  long term smoker. mri scan demonstrates bilateral lt greater than  right hayley orbital w/ infiltrative process.   Dx: Idiopathic orbital inflammatory process  9/1 MRI shows preseptal soft tissue swelling on the left as well as retrobulbar fat infiltration and perioptic fat plane infiltration/enhancement left greater than right. 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 Other DDx include IgG4-related ophthalmic disease.         Steroids, hold off antibx, seen by Optho  9/2/17 left eye pain persists, spoke with opthal advised to give 1g of solumerdorl and they will re-evaluate the response. Neuro to follow up aqs well...paged

## 2017-09-03 NOTE — DISCHARGE NOTE ADULT - CARE PROVIDER_API CALL
Rafael Gayle (MD), Ophthalmology  [address]  [place] [address]  Phone: [phone]  Fax: [phone] Rafael Gayle), Ophthalmology  [address] [facility] [address]  [address]  [place] [address]  Phone: [phone]  Fax: [phone]    Vincenzo Gonzalez (), Neurology; Vascular Neurology  [address] [facility] [facility] [address]  [place] [address]  Phone: [phone]  Fax: [phone]

## 2017-09-03 NOTE — DISCHARGE NOTE ADULT - SECONDARY DIAGNOSIS.
Essential hypertension Gastroesophageal reflux disease, esophagitis presence not specified Depression, unspecified depression type

## 2017-09-03 NOTE — CHART NOTE - NSCHARTNOTEFT_GEN_A_CORE
Request from Dr. Nagy to facilitate discharge.  Medication reconciliation  reviewed, revised and resolved with Dr. Nagy  who has medically cleared pt for discharge with follow up as advised. Please refer to discharge note for detailed hospital course.   [place] S[facility]A   [id number]

## 2017-09-04 ENCOUNTER — TRANSCRIPTION ENCOUNTER (OUTPATIENT)
Age: 57
End: 2017-09-04

## 2017-09-05 ENCOUNTER — APPOINTMENT (OUTPATIENT)
Dept: OPHTHALMOLOGY | Facility: CLINIC | Age: 57
End: 2017-09-05
Payer: COMMERCIAL

## 2017-09-05 LAB
IGG SERPL-MCNC: 839 MG/DL — SIGNIFICANT CHANGE UP (ref 767–1590)
IGG1 SER-MCNC: 610 MG/DL — SIGNIFICANT CHANGE UP (ref 341–894)
IGG2 SER-MCNC: 158 MG/DL — LOW (ref 171–632)
IGG3 SER-MCNC: 81.4 MG/DL — SIGNIFICANT CHANGE UP (ref 18.4–106)
IGG4 SER-MCNC: 35.8 MG/DL — SIGNIFICANT CHANGE UP (ref 2.4–121)

## 2017-09-05 PROCEDURE — 92012 INTRM OPH EXAM EST PATIENT: CPT

## 2017-09-05 PROCEDURE — 92060 SENSORIMOTOR EXAMINATION: CPT

## 2017-09-07 LAB
ACHR BLOCK AB SER-ACNC: <15 — SIGNIFICANT CHANGE UP
ACHR MOD AB SER-ACNC: 21 — SIGNIFICANT CHANGE UP

## 2017-09-09 ENCOUNTER — MOBILE ON CALL (OUTPATIENT)
Age: 57
End: 2017-09-09

## 2017-09-13 LAB — MUSK IGG SER IA-MCNC: SIGNIFICANT CHANGE UP

## 2017-09-19 ENCOUNTER — APPOINTMENT (OUTPATIENT)
Dept: OPHTHALMOLOGY | Facility: CLINIC | Age: 57
End: 2017-09-19
Payer: COMMERCIAL

## 2017-09-19 PROCEDURE — 92060 SENSORIMOTOR EXAMINATION: CPT

## 2017-09-19 PROCEDURE — 92012 INTRM OPH EXAM EST PATIENT: CPT

## 2017-10-03 ENCOUNTER — APPOINTMENT (OUTPATIENT)
Dept: OPHTHALMOLOGY | Facility: CLINIC | Age: 57
End: 2017-10-03
Payer: COMMERCIAL

## 2017-10-03 DIAGNOSIS — Z83.518 FAMILY HISTORY OF OTHER SPECIFIED EYE DISORDER: ICD-10-CM

## 2017-10-03 PROCEDURE — 92060 SENSORIMOTOR EXAMINATION: CPT

## 2017-10-03 PROCEDURE — 92012 INTRM OPH EXAM EST PATIENT: CPT

## 2017-10-17 ENCOUNTER — APPOINTMENT (OUTPATIENT)
Dept: OPHTHALMOLOGY | Facility: CLINIC | Age: 57
End: 2017-10-17
Payer: COMMERCIAL

## 2017-10-17 PROCEDURE — 92012 INTRM OPH EXAM EST PATIENT: CPT

## 2017-10-17 PROCEDURE — 92060 SENSORIMOTOR EXAMINATION: CPT

## 2017-10-30 ENCOUNTER — APPOINTMENT (OUTPATIENT)
Dept: OTOLARYNGOLOGY | Facility: CLINIC | Age: 57
End: 2017-10-30

## 2017-11-01 ENCOUNTER — APPOINTMENT (OUTPATIENT)
Dept: MRI IMAGING | Facility: CLINIC | Age: 57
End: 2017-11-01
Payer: COMMERCIAL

## 2017-11-01 ENCOUNTER — OUTPATIENT (OUTPATIENT)
Dept: OUTPATIENT SERVICES | Facility: HOSPITAL | Age: 57
LOS: 1 days | End: 2017-11-01
Payer: COMMERCIAL

## 2017-11-01 DIAGNOSIS — H49.20 SIXTH [ABDUCENT] NERVE PALSY, UNSPECIFIED EYE: ICD-10-CM

## 2017-11-01 DIAGNOSIS — Z98.89 OTHER SPECIFIED POSTPROCEDURAL STATES: Chronic | ICD-10-CM

## 2017-11-01 DIAGNOSIS — Z87.39 PERSONAL HISTORY OF OTHER DISEASES OF THE MUSCULOSKELETAL SYSTEM AND CONNECTIVE TISSUE: Chronic | ICD-10-CM

## 2017-11-01 DIAGNOSIS — H53.2 DIPLOPIA: ICD-10-CM

## 2017-11-01 DIAGNOSIS — Z00.8 ENCOUNTER FOR OTHER GENERAL EXAMINATION: ICD-10-CM

## 2017-11-01 DIAGNOSIS — R42 DIZZINESS AND GIDDINESS: ICD-10-CM

## 2017-11-01 PROCEDURE — 70543 MRI ORBT/FAC/NCK W/O &W/DYE: CPT

## 2017-11-01 PROCEDURE — 70552 MRI BRAIN STEM W/DYE: CPT | Mod: 26

## 2017-11-01 PROCEDURE — 70545 MR ANGIOGRAPHY HEAD W/DYE: CPT | Mod: 26,59

## 2017-11-01 PROCEDURE — 82565 ASSAY OF CREATININE: CPT

## 2017-11-01 PROCEDURE — 70542 MRI ORBIT/FACE/NECK W/DYE: CPT | Mod: 26

## 2017-11-01 PROCEDURE — 70546 MR ANGIOGRAPH HEAD W/O&W/DYE: CPT

## 2017-11-01 PROCEDURE — A9585: CPT

## 2017-11-01 PROCEDURE — 70553 MRI BRAIN STEM W/O & W/DYE: CPT

## 2017-11-06 ENCOUNTER — APPOINTMENT (OUTPATIENT)
Dept: OPHTHALMOLOGY | Facility: CLINIC | Age: 57
End: 2017-11-06
Payer: COMMERCIAL

## 2017-11-06 PROCEDURE — 92060 SENSORIMOTOR EXAMINATION: CPT

## 2017-11-06 PROCEDURE — 92012 INTRM OPH EXAM EST PATIENT: CPT

## 2017-12-04 ENCOUNTER — APPOINTMENT (OUTPATIENT)
Dept: OPHTHALMOLOGY | Facility: CLINIC | Age: 57
End: 2017-12-04
Payer: COMMERCIAL

## 2017-12-04 PROCEDURE — 92060 SENSORIMOTOR EXAMINATION: CPT

## 2017-12-04 PROCEDURE — 92014 COMPRE OPH EXAM EST PT 1/>: CPT

## 2018-02-05 ENCOUNTER — APPOINTMENT (OUTPATIENT)
Dept: OPHTHALMOLOGY | Facility: CLINIC | Age: 58
End: 2018-02-05
Payer: COMMERCIAL

## 2018-02-05 PROCEDURE — 92012 INTRM OPH EXAM EST PATIENT: CPT

## 2018-02-05 PROCEDURE — 92060 SENSORIMOTOR EXAMINATION: CPT

## 2018-03-05 ENCOUNTER — OUTPATIENT (OUTPATIENT)
Dept: OUTPATIENT SERVICES | Facility: HOSPITAL | Age: 58
LOS: 1 days | End: 2018-03-05
Payer: COMMERCIAL

## 2018-03-05 VITALS
HEART RATE: 82 BPM | TEMPERATURE: 99 F | HEIGHT: 58 IN | WEIGHT: 134.92 LBS | RESPIRATION RATE: 16 BRPM | SYSTOLIC BLOOD PRESSURE: 120 MMHG | OXYGEN SATURATION: 99 % | DIASTOLIC BLOOD PRESSURE: 70 MMHG

## 2018-03-05 DIAGNOSIS — Z98.89 OTHER SPECIFIED POSTPROCEDURAL STATES: Chronic | ICD-10-CM

## 2018-03-05 DIAGNOSIS — R94.31 ABNORMAL ELECTROCARDIOGRAM [ECG] [EKG]: ICD-10-CM

## 2018-03-05 DIAGNOSIS — R59.0 LOCALIZED ENLARGED LYMPH NODES: ICD-10-CM

## 2018-03-05 DIAGNOSIS — Z91.040 LATEX ALLERGY STATUS: ICD-10-CM

## 2018-03-05 DIAGNOSIS — Z87.39 PERSONAL HISTORY OF OTHER DISEASES OF THE MUSCULOSKELETAL SYSTEM AND CONNECTIVE TISSUE: Chronic | ICD-10-CM

## 2018-03-05 LAB
BUN SERPL-MCNC: 25 MG/DL — HIGH (ref 7–23)
CALCIUM SERPL-MCNC: 9.4 MG/DL — SIGNIFICANT CHANGE UP (ref 8.4–10.5)
CHLORIDE SERPL-SCNC: 101 MMOL/L — SIGNIFICANT CHANGE UP (ref 98–107)
CO2 SERPL-SCNC: 25 MMOL/L — SIGNIFICANT CHANGE UP (ref 22–31)
CREAT SERPL-MCNC: 0.61 MG/DL — SIGNIFICANT CHANGE UP (ref 0.5–1.3)
GLUCOSE SERPL-MCNC: 95 MG/DL — SIGNIFICANT CHANGE UP (ref 70–99)
HCT VFR BLD CALC: 40.4 % — SIGNIFICANT CHANGE UP (ref 34.5–45)
HGB BLD-MCNC: 13.9 G/DL — SIGNIFICANT CHANGE UP (ref 11.5–15.5)
MCHC RBC-ENTMCNC: 32.3 PG — SIGNIFICANT CHANGE UP (ref 27–34)
MCHC RBC-ENTMCNC: 34.4 % — SIGNIFICANT CHANGE UP (ref 32–36)
MCV RBC AUTO: 93.7 FL — SIGNIFICANT CHANGE UP (ref 80–100)
NRBC # FLD: 0 — SIGNIFICANT CHANGE UP
PLATELET # BLD AUTO: 282 K/UL — SIGNIFICANT CHANGE UP (ref 150–400)
PMV BLD: 9 FL — SIGNIFICANT CHANGE UP (ref 7–13)
POTASSIUM SERPL-MCNC: 3.9 MMOL/L — SIGNIFICANT CHANGE UP (ref 3.5–5.3)
POTASSIUM SERPL-SCNC: 3.9 MMOL/L — SIGNIFICANT CHANGE UP (ref 3.5–5.3)
RBC # BLD: 4.31 M/UL — SIGNIFICANT CHANGE UP (ref 3.8–5.2)
RBC # FLD: 13.9 % — SIGNIFICANT CHANGE UP (ref 10.3–14.5)
SODIUM SERPL-SCNC: 140 MMOL/L — SIGNIFICANT CHANGE UP (ref 135–145)
WBC # BLD: 7.25 K/UL — SIGNIFICANT CHANGE UP (ref 3.8–10.5)
WBC # FLD AUTO: 7.25 K/UL — SIGNIFICANT CHANGE UP (ref 3.8–10.5)

## 2018-03-05 PROCEDURE — 93010 ELECTROCARDIOGRAM REPORT: CPT

## 2018-03-05 RX ORDER — DIPHENHYDRAMINE HCL 50 MG
1 CAPSULE ORAL
Qty: 0 | Refills: 0 | COMMUNITY

## 2018-03-05 RX ORDER — ACETAMINOPHEN 500 MG
2 TABLET ORAL
Qty: 0 | Refills: 0 | COMMUNITY

## 2018-03-05 RX ORDER — IBUPROFEN 200 MG
1 TABLET ORAL
Qty: 0 | Refills: 0 | COMMUNITY

## 2018-03-05 RX ORDER — LOSARTAN/HYDROCHLOROTHIAZIDE 100MG-25MG
0 TABLET ORAL
Qty: 0 | Refills: 0 | COMMUNITY

## 2018-03-05 RX ORDER — FLUTICASONE PROPIONATE 50 MCG
1 SPRAY, SUSPENSION NASAL
Qty: 0 | Refills: 0 | COMMUNITY

## 2018-03-05 RX ORDER — FESOTERODINE FUMARATE 8 MG/1
1 TABLET, FILM COATED, EXTENDED RELEASE ORAL
Qty: 0 | Refills: 0 | COMMUNITY

## 2018-03-05 NOTE — H&P PST ADULT - PROBLEM SELECTOR PLAN 1
Scheduled for Left Axillary Node Biopsy on 3/8/2018.  Preop instructions given, pt verbalized understanding   Chlorhexidine wash and GI prophylaxis provided   Spoke to Medical Assistant Markus at Dr. Min and confirmed the procedure site. Procedure to be performed on the LEFT  Last neuro visit note requested from Neurologist Scheduled for Left Axillary Node Biopsy on 3/8/2018.  Preop instructions given, pt verbalized understanding   Chlorhexidine wash and GI prophylaxis provided   Spoke to Medical Assistant Markus at Dr. Min office and confirmed the procedure site. Procedure to be performed on the LEFT  Last neuro visit note requested from Neurologist

## 2018-03-05 NOTE — H&P PST ADULT - NEGATIVE CARDIOVASCULAR SYMPTOMS
no chest pain/no orthopnea/no paroxysmal nocturnal dyspnea/no dyspnea on exertion/no peripheral edema/no palpitations/no claudication

## 2018-03-05 NOTE — H&P PST ADULT - NEGATIVE GENERAL GENITOURINARY SYMPTOMS
no dysuria/no urinary hesitancy/no hematuria/no bladder infections/no flank pain L/no flank pain R/no renal colic/normal urinary frequency/no incontinence

## 2018-03-05 NOTE — H&P PST ADULT - NEGATIVE OPHTHALMOLOGIC SYMPTOMS
no pain R/no pain L/no diplopia/no blurred vision L/no blurred vision R/no loss of vision L/no loss of vision R

## 2018-03-05 NOTE — H&P PST ADULT - HISTORY OF PRESENT ILLNESS
58 y/o female presents to PST for preoperative evaluation with dx of localized enlarged lymph nodes. Pt reports while undergoing testing and treatment for 6 nerve palsy, enlarged axillary lymph nodes was noted. Pt states she did have a right axillary right lymph node biopsy 2 years ago.   Scheduled for Right Lymph Node Biopsy on 3/8/2018. 56 y/o female presents to PST for preoperative evaluation with dx of localized enlarged lymph nodes. Pt reports while undergoing testing and treatment for 6 nerve palsy of bilateral eyes, enlarged axillary lymph nodes was noted. Scheduled for Right Lymph Node Biopsy on 3/8/2018. 58 y/o female presents to PST for preoperative evaluation with dx of localized enlarged lymph nodes. Pt reports while undergoing testing and treatment for 6 nerve palsy of bilateral eyes, enlarged axillary lymph nodes was noted. Scheduled for Left Axillary Node Biopsy on 3/8/2018.

## 2018-03-05 NOTE — H&P PST ADULT - PROBLEM SELECTOR PLAN 2
Pt with EKG changes from previous EKG  Last seen by cardiologist in 2016  Instructed to obtain Cardiac clearance from her Cardiologist   She has an appt on 3/7 at 8:40am for clearance   Cardiac clearance and ECHO reports requested

## 2018-03-05 NOTE — H&P PST ADULT - ATTENDING COMMENTS
I have examined this patient on admission and have found the right axillary lymph node to be more prominent. Therefore, I will perform a right axillary node excisional biopsy. The right axilla has been marked accordingly.

## 2018-03-05 NOTE — H&P PST ADULT - NEUROLOGICAL COMMENTS
h/o carpel tunnel syndrome in both hands, L>R.  h/o 6 nerves palsy and treated with prednisone. Seen by Eye specialist and Neuro. Pt states 6 nerve palsy resolved.

## 2018-03-05 NOTE — H&P PST ADULT - LYMPHATIC
posterior cervical L/supraclavicular L/posterior cervical R/anterior cervical L/supraclavicular R/axillary R/axillary L/anterior cervical R

## 2018-03-05 NOTE — H&P PST ADULT - RS GEN PE MLT RESP DETAILS PC
no chest wall tenderness/respirations non-labored/clear to auscultation bilaterally/airway patent/breath sounds equal

## 2018-03-05 NOTE — H&P PST ADULT - NEGATIVE ENMT SYMPTOMS
no gum bleeding/no tinnitus/no hearing difficulty/no nasal discharge/no throat pain/no dysphagia/no ear pain/no nose bleeds

## 2018-03-05 NOTE — H&P PST ADULT - PMH
Amblyopia of eye, bilateral    Anxiety and depression    Arthritis    Bursitis  left arm  Carpal tunnel syndrome, bilateral    Cataract    Environmental allergies    GERD (gastroesophageal reflux disease)    Hiatal hernia    HTN (hypertension)    IBS (irritable bowel syndrome)    Latex allergy    Leaky heart valve  pt reports history of "leaky valve", states it is now no longer there?  Localized enlarged lymph nodes    Morbidly obese    OAB (overactive bladder)    Sciatica    Seasonal asthma    Sinusitis    Vertigo

## 2018-03-05 NOTE — H&P PST ADULT - NEGATIVE GASTROINTESTINAL SYMPTOMS
no nausea/no diarrhea/no vomiting/no change in bowel habits/no melena/no constipation/no abdominal pain

## 2018-03-05 NOTE — H&P PST ADULT - NEGATIVE GENERAL SYMPTOMS
no fatigue/no anorexia/no weight gain/no malaise/no weight loss/no polyphagia/no polyuria/no polydipsia/no fever/no chills

## 2018-03-05 NOTE — H&P PST ADULT - LYMPHATICS COMMENTS
unable to palpate bilateral axillary lymph nodes on exam. Surgeon with ordinal studies unable to palpate bilateral axillary lymph nodes on exam. Surgeon with original studies

## 2018-03-05 NOTE — H&P PST ADULT - NEGATIVE NEUROLOGICAL SYMPTOMS
no paresthesias/no generalized seizures/no loss of consciousness/no hemiparesis/no transient paralysis/no syncope/no loss of sensation/no difficulty walking/no weakness/no confusion/no facial palsy/no focal seizures/no headache

## 2018-03-05 NOTE — H&P PST ADULT - NEGATIVE BREAST SYMPTOMS
no breast tenderness L/no breast tenderness R/no breast lump L/no nipple discharge L/no breast lump R/no nipple discharge R

## 2018-03-08 ENCOUNTER — TRANSCRIPTION ENCOUNTER (OUTPATIENT)
Age: 58
End: 2018-03-08

## 2018-03-08 ENCOUNTER — RESULT REVIEW (OUTPATIENT)
Age: 58
End: 2018-03-08

## 2018-03-08 ENCOUNTER — OUTPATIENT (OUTPATIENT)
Dept: OUTPATIENT SERVICES | Facility: HOSPITAL | Age: 58
LOS: 1 days | Discharge: HOME | End: 2018-03-08
Payer: COMMERCIAL

## 2018-03-08 VITALS
TEMPERATURE: 98 F | SYSTOLIC BLOOD PRESSURE: 124 MMHG | HEART RATE: 86 BPM | RESPIRATION RATE: 16 BRPM | OXYGEN SATURATION: 99 % | DIASTOLIC BLOOD PRESSURE: 77 MMHG

## 2018-03-08 VITALS
TEMPERATURE: 99 F | WEIGHT: 13.67 LBS | HEART RATE: 77 BPM | RESPIRATION RATE: 16 BRPM | SYSTOLIC BLOOD PRESSURE: 124 MMHG | OXYGEN SATURATION: 99 % | DIASTOLIC BLOOD PRESSURE: 76 MMHG | HEIGHT: 58 IN

## 2018-03-08 DIAGNOSIS — Z98.89 OTHER SPECIFIED POSTPROCEDURAL STATES: Chronic | ICD-10-CM

## 2018-03-08 DIAGNOSIS — Z87.39 PERSONAL HISTORY OF OTHER DISEASES OF THE MUSCULOSKELETAL SYSTEM AND CONNECTIVE TISSUE: Chronic | ICD-10-CM

## 2018-03-08 DIAGNOSIS — R59.0 LOCALIZED ENLARGED LYMPH NODES: ICD-10-CM

## 2018-03-08 PROCEDURE — 88341 IMHCHEM/IMCYTCHM EA ADD ANTB: CPT | Mod: 26,59

## 2018-03-08 PROCEDURE — 88307 TISSUE EXAM BY PATHOLOGIST: CPT | Mod: 26

## 2018-03-08 PROCEDURE — 88342 IMHCHEM/IMCYTCHM 1ST ANTB: CPT | Mod: 26,59

## 2018-03-08 PROCEDURE — 88189 FLOWCYTOMETRY/READ 16 & >: CPT

## 2018-03-08 PROCEDURE — 88312 SPECIAL STAINS GROUP 1: CPT | Mod: 26,59

## 2018-03-08 PROCEDURE — 88331 PATH CONSLTJ SURG 1 BLK 1SPC: CPT | Mod: 26

## 2018-03-08 PROCEDURE — 88360 TUMOR IMMUNOHISTOCHEM/MANUAL: CPT | Mod: 26

## 2018-03-08 RX ORDER — SODIUM CHLORIDE 9 MG/ML
3 INJECTION INTRAMUSCULAR; INTRAVENOUS; SUBCUTANEOUS ONCE
Qty: 0 | Refills: 0 | Status: DISCONTINUED | OUTPATIENT
Start: 2018-03-08 | End: 2018-03-09

## 2018-03-08 RX ORDER — NIACIN 50 MG
0 TABLET ORAL
Qty: 0 | Refills: 0 | COMMUNITY

## 2018-03-08 RX ORDER — IBUPROFEN 200 MG
1 TABLET ORAL
Qty: 0 | Refills: 0 | COMMUNITY

## 2018-03-08 RX ORDER — ACETAMINOPHEN 500 MG
2 TABLET ORAL
Qty: 0 | Refills: 0 | COMMUNITY

## 2018-03-08 RX ORDER — L.ACIDOPH/B.ANIMALIS/B.LONGUM 15B CELL
1 CAPSULE ORAL
Qty: 0 | Refills: 0 | COMMUNITY

## 2018-03-08 RX ORDER — SODIUM CHLORIDE 9 MG/ML
1000 INJECTION, SOLUTION INTRAVENOUS
Qty: 0 | Refills: 0 | Status: DISCONTINUED | OUTPATIENT
Start: 2018-03-08 | End: 2018-03-09

## 2018-03-08 RX ORDER — LINACLOTIDE 145 UG/1
1 CAPSULE, GELATIN COATED ORAL
Qty: 0 | Refills: 0 | COMMUNITY

## 2018-03-08 RX ORDER — ASPIRIN/CALCIUM CARB/MAGNESIUM 324 MG
1 TABLET ORAL
Qty: 0 | Refills: 0 | COMMUNITY

## 2018-03-08 RX ADMIN — SODIUM CHLORIDE 30 MILLILITER(S): 9 INJECTION, SOLUTION INTRAVENOUS at 19:06

## 2018-03-08 NOTE — ASU DISCHARGE PLAN (ADULT/PEDIATRIC). - NURSING INSTRUCTIONS
See medication reconciliation record  You were given an antibiotic ( Cefazolin 2000mg) in OR today about 4:30pm  You were given Toradol for pain management. Please DO Not take Motrin/Ibuprofen (NSAIDS) for the next 6 hours (Until _10:45pm__).   You were given IV Tylenol for pain management.  Please DO NOT take tylenol for the next 6-8 hours (after 11pm ). Please do not exceed 3000mg in 24hours.

## 2018-03-08 NOTE — ASU DISCHARGE PLAN (ADULT/PEDIATRIC). - COMMENTS
Surgical Unit will call you on the next business day to follow up. [facility] is open Monday - Friday.

## 2018-03-08 NOTE — BRIEF OPERATIVE NOTE - PROCEDURE
<<-----Click on this checkbox to enter Procedure Axillary lymph node sampling  03/08/2018  Excisional biopsy of right axillary lymphadenopathy (2 nodes)  Active  ALEM

## 2018-03-08 NOTE — ASU DISCHARGE PLAN (ADULT/PEDIATRIC). - FOLLOWUP APPOINTMENT CLINIC/PHYSICIAN
Dr. Min Dr. Min  Call your surgeon's office later today or tomorrow to schedule a follow up appointment.

## 2018-03-08 NOTE — ASU DISCHARGE PLAN (ADULT/PEDIATRIC). - MEDICATION SUMMARY - MEDICATIONS TO TAKE
I will START or STAY ON the medications listed below when I get home from the hospital:    ponaris nasal emllient daily  -- Indication: For Home medication    MSM 1000mg daily  -- last dose 3/5  -- Indication: For Home medication    aspirin 81 mg oral tablet  -- 1 tab(s) by mouth once a day. last dose 3/5  -- Indication: For Home medication    acetaminophen 325 mg oral tablet  -- 2 tab(s) by mouth every 6 hours, As Needed for pain. Do not exceed 4000 mg of total acetaminophen per day.  -- Indication: For Post-operative pain control    Advil 200 mg oral tablet  -- 1 tab(s) by mouth every 6 hours, As Needed last dose 3/5  -- Indication: For Home medication    niacin 100 mg oral tablet  -- orally once a day  -- Indication: For Home medication    busPIRone 15 mg oral tablet  -- 2 tab(s) by mouth once a day (at bedtime)  -- Indication: For Home medication    hydroCHLOROthiazide 25 mg oral tablet  -- 1 tab(s) by mouth once a day (at bedtime)  -- Indication: For Home medication    Linzess 290 mcg oral capsule  -- 1 cap(s) by mouth once a day  -- Indication: For Home medication    Probiotic Formula oral capsule  -- 1 cap(s) by mouth once a day (at bedtime)  -- Indication: For Home medication

## 2018-03-08 NOTE — ASU DISCHARGE PLAN (ADULT/PEDIATRIC). - NOTIFY
Numbness, color, or temperature change to extremity/Swelling that continues/Pain not relieved by Medications/Fever greater than 101/Bleeding that does not stop Persistent Nausea and Vomiting/Bleeding that does not stop/Swelling that continues/Fever greater than 101/Pain not relieved by Medications/GYN Fever>100.4/Numbness, color, or temperature change to extremity/Unable to Urinate

## 2018-03-12 LAB — TM INTERPRETATION: SIGNIFICANT CHANGE UP

## 2018-03-14 LAB — HEMATOPATHOLOGY REPORT: SIGNIFICANT CHANGE UP

## 2018-03-19 ENCOUNTER — APPOINTMENT (OUTPATIENT)
Dept: OPHTHALMOLOGY | Facility: CLINIC | Age: 58
End: 2018-03-19
Payer: COMMERCIAL

## 2018-03-19 DIAGNOSIS — H15.012 ANTERIOR SCLERITIS, LEFT EYE: ICD-10-CM

## 2018-03-19 PROCEDURE — 92012 INTRM OPH EXAM EST PATIENT: CPT

## 2018-03-22 ENCOUNTER — MOBILE ON CALL (OUTPATIENT)
Age: 58
End: 2018-03-22

## 2018-04-09 ENCOUNTER — APPOINTMENT (OUTPATIENT)
Dept: OPHTHALMOLOGY | Facility: CLINIC | Age: 58
End: 2018-04-09
Payer: COMMERCIAL

## 2018-04-09 PROCEDURE — 92012 INTRM OPH EXAM EST PATIENT: CPT

## 2018-07-17 ENCOUNTER — RX CHANGE (OUTPATIENT)
Age: 58
End: 2018-07-17

## 2018-07-26 ENCOUNTER — APPOINTMENT (OUTPATIENT)
Dept: PULMONOLOGY | Facility: CLINIC | Age: 58
End: 2018-07-26
Payer: COMMERCIAL

## 2018-07-26 VITALS
RESPIRATION RATE: 15 BRPM | HEART RATE: 81 BPM | DIASTOLIC BLOOD PRESSURE: 70 MMHG | TEMPERATURE: 98.2 F | WEIGHT: 130 LBS | HEIGHT: 59 IN | BODY MASS INDEX: 26.21 KG/M2 | OXYGEN SATURATION: 96 % | SYSTOLIC BLOOD PRESSURE: 113 MMHG

## 2018-07-26 PROCEDURE — 94060 EVALUATION OF WHEEZING: CPT

## 2018-07-26 PROCEDURE — 99213 OFFICE O/P EST LOW 20 MIN: CPT | Mod: 25

## 2018-07-26 RX ORDER — AZITHROMYCIN 250 MG/1
250 TABLET, FILM COATED ORAL
Qty: 1 | Refills: 0 | Status: COMPLETED | COMMUNITY
Start: 2018-07-17 | End: 2018-07-22

## 2018-07-26 RX ORDER — PREDNISONE 20 MG/1
20 TABLET ORAL
Refills: 0 | Status: DISCONTINUED | COMMUNITY
End: 2018-07-26

## 2018-07-26 RX ORDER — PREDNISONE 10 MG/1
10 TABLET ORAL
Qty: 60 | Refills: 3 | Status: DISCONTINUED | COMMUNITY
Start: 2018-04-09 | End: 2018-07-26

## 2018-07-26 RX ORDER — PREDNISONE 10 MG/1
10 TABLET ORAL
Qty: 30 | Refills: 6 | Status: DISCONTINUED | COMMUNITY
Start: 2018-03-19 | End: 2018-07-26

## 2018-07-26 RX ORDER — PREDNISONE 5 MG/1
5 TABLET ORAL 3 TIMES DAILY
Qty: 90 | Refills: 3 | Status: DISCONTINUED | COMMUNITY
Start: 2017-10-03 | End: 2018-07-26

## 2018-07-26 RX ORDER — HYDROCHLOROTHIAZIDE 25 MG/1
25 TABLET ORAL
Refills: 0 | Status: ACTIVE | COMMUNITY

## 2018-07-26 RX ORDER — PREDNISOLONE 5 MG/1
TABLET ORAL
Refills: 0 | Status: DISCONTINUED | COMMUNITY
End: 2018-07-26

## 2018-07-26 RX ORDER — ACETAMINOPHEN AND CODEINE PHOSPHATE 300; 15 MG/1; MG/1
300-15 TABLET ORAL
Qty: 168 | Refills: 0 | Status: DISCONTINUED | COMMUNITY
Start: 2018-04-09 | End: 2018-07-26

## 2018-08-03 ENCOUNTER — RECORD ABSTRACTING (OUTPATIENT)
Age: 58
End: 2018-08-03

## 2018-08-14 ENCOUNTER — APPOINTMENT (OUTPATIENT)
Dept: PULMONOLOGY | Facility: CLINIC | Age: 58
End: 2018-08-14
Payer: COMMERCIAL

## 2018-08-14 VITALS
RESPIRATION RATE: 16 BRPM | TEMPERATURE: 98.3 F | SYSTOLIC BLOOD PRESSURE: 111 MMHG | HEIGHT: 59 IN | BODY MASS INDEX: 26.21 KG/M2 | HEART RATE: 81 BPM | OXYGEN SATURATION: 96 % | DIASTOLIC BLOOD PRESSURE: 71 MMHG | WEIGHT: 130 LBS

## 2018-08-14 DIAGNOSIS — R09.1 PLEURISY: ICD-10-CM

## 2018-08-14 PROBLEM — Z91.040 LATEX ALLERGY STATUS: Chronic | Status: ACTIVE | Noted: 2018-03-05

## 2018-08-14 PROBLEM — R59.0 LOCALIZED ENLARGED LYMPH NODES: Chronic | Status: ACTIVE | Noted: 2018-03-05

## 2018-08-14 PROBLEM — K58.9 IRRITABLE BOWEL SYNDROME WITHOUT DIARRHEA: Chronic | Status: ACTIVE | Noted: 2018-03-05

## 2018-08-14 PROBLEM — K58.9 IRRITABLE BOWEL SYNDROME, UNSPECIFIED: Chronic | Status: ACTIVE | Noted: 2018-03-05

## 2018-08-14 PROBLEM — R42 DIZZINESS AND GIDDINESS: Chronic | Status: ACTIVE | Noted: 2018-03-05

## 2018-08-14 PROCEDURE — 36415 COLL VENOUS BLD VENIPUNCTURE: CPT

## 2018-08-14 PROCEDURE — 71046 X-RAY EXAM CHEST 2 VIEWS: CPT

## 2018-08-14 PROCEDURE — 99213 OFFICE O/P EST LOW 20 MIN: CPT | Mod: 25

## 2018-08-16 PROBLEM — R09.1 PLEURISY: Status: ACTIVE | Noted: 2018-08-16

## 2018-08-16 LAB
ALBUMIN SERPL ELPH-MCNC: 4.7 G/DL
ALP BLD-CCNC: 58 U/L
ALT SERPL-CCNC: 19 U/L
ANION GAP SERPL CALC-SCNC: 17 MMOL/L
AST SERPL-CCNC: 18 U/L
BASOPHILS # BLD AUTO: 0.05 K/UL
BASOPHILS NFR BLD AUTO: 0.6 %
BILIRUB SERPL-MCNC: 0.3 MG/DL
BUN SERPL-MCNC: 20 MG/DL
CALCIUM SERPL-MCNC: 10.4 MG/DL
CHLORIDE SERPL-SCNC: 98 MMOL/L
CO2 SERPL-SCNC: 25 MMOL/L
CREAT SERPL-MCNC: 0.67 MG/DL
DEPRECATED D DIMER PPP IA-ACNC: 256 NG/ML DDU
EOSINOPHIL # BLD AUTO: 0.05 K/UL
EOSINOPHIL NFR BLD AUTO: 0.6 %
GLUCOSE SERPL-MCNC: 103 MG/DL
HCT VFR BLD CALC: 44.2 %
HGB BLD-MCNC: 14.6 G/DL
IMM GRANULOCYTES NFR BLD AUTO: 0.3 %
LYMPHOCYTES # BLD AUTO: 2.29 K/UL
LYMPHOCYTES NFR BLD AUTO: 29.7 %
MAN DIFF?: NORMAL
MCHC RBC-ENTMCNC: 31.5 PG
MCHC RBC-ENTMCNC: 33 GM/DL
MCV RBC AUTO: 95.3 FL
MONOCYTES # BLD AUTO: 0.48 K/UL
MONOCYTES NFR BLD AUTO: 6.2 %
NEUTROPHILS # BLD AUTO: 4.83 K/UL
NEUTROPHILS NFR BLD AUTO: 62.6 %
PLATELET # BLD AUTO: 285 K/UL
POTASSIUM SERPL-SCNC: 4.3 MMOL/L
PROT SERPL-MCNC: 7.4 G/DL
RBC # BLD: 4.64 M/UL
RBC # FLD: 16.3 %
SODIUM SERPL-SCNC: 140 MMOL/L
WBC # FLD AUTO: 7.72 K/UL

## 2018-08-24 ENCOUNTER — APPOINTMENT (OUTPATIENT)
Dept: PULMONOLOGY | Facility: CLINIC | Age: 58
End: 2018-08-24

## 2018-08-28 ENCOUNTER — APPOINTMENT (OUTPATIENT)
Dept: PULMONOLOGY | Facility: CLINIC | Age: 58
End: 2018-08-28

## 2018-09-04 ENCOUNTER — APPOINTMENT (OUTPATIENT)
Dept: PULMONOLOGY | Facility: CLINIC | Age: 58
End: 2018-09-04
Payer: COMMERCIAL

## 2018-09-04 VITALS
OXYGEN SATURATION: 93 % | RESPIRATION RATE: 16 BRPM | SYSTOLIC BLOOD PRESSURE: 113 MMHG | HEART RATE: 85 BPM | DIASTOLIC BLOOD PRESSURE: 71 MMHG

## 2018-09-04 DIAGNOSIS — M85.80 OTHER SPECIFIED DISORDERS OF BONE DENSITY AND STRUCTURE, UNSPECIFIED SITE: ICD-10-CM

## 2018-09-04 PROCEDURE — 77080 DXA BONE DENSITY AXIAL: CPT

## 2018-09-04 PROCEDURE — 99213 OFFICE O/P EST LOW 20 MIN: CPT | Mod: 25

## 2018-09-04 RX ORDER — METHYLPREDNISOLONE 4 MG/1
4 TABLET ORAL
Qty: 1 | Refills: 0 | Status: DISCONTINUED | COMMUNITY
Start: 2018-08-14 | End: 2018-09-04

## 2018-09-21 ENCOUNTER — APPOINTMENT (OUTPATIENT)
Dept: PULMONOLOGY | Facility: CLINIC | Age: 58
End: 2018-09-21
Payer: COMMERCIAL

## 2018-09-21 VITALS
OXYGEN SATURATION: 98 % | HEART RATE: 82 BPM | DIASTOLIC BLOOD PRESSURE: 74 MMHG | RESPIRATION RATE: 16 BRPM | SYSTOLIC BLOOD PRESSURE: 117 MMHG | TEMPERATURE: 98.2 F

## 2018-09-21 PROCEDURE — 99213 OFFICE O/P EST LOW 20 MIN: CPT

## 2018-09-25 ENCOUNTER — RX RENEWAL (OUTPATIENT)
Age: 58
End: 2018-09-25

## 2018-10-14 ENCOUNTER — RX RENEWAL (OUTPATIENT)
Age: 58
End: 2018-10-14

## 2018-10-16 ENCOUNTER — LABORATORY RESULT (OUTPATIENT)
Age: 58
End: 2018-10-16

## 2018-10-16 ENCOUNTER — APPOINTMENT (OUTPATIENT)
Dept: PULMONOLOGY | Facility: CLINIC | Age: 58
End: 2018-10-16
Payer: COMMERCIAL

## 2018-10-16 VITALS — SYSTOLIC BLOOD PRESSURE: 147 MMHG | OXYGEN SATURATION: 100 % | HEART RATE: 75 BPM | DIASTOLIC BLOOD PRESSURE: 80 MMHG

## 2018-10-16 PROCEDURE — G0008: CPT

## 2018-10-16 PROCEDURE — 90686 IIV4 VACC NO PRSV 0.5 ML IM: CPT

## 2018-10-16 PROCEDURE — 99214 OFFICE O/P EST MOD 30 MIN: CPT | Mod: 25

## 2018-10-16 PROCEDURE — 94250: CPT

## 2018-10-16 PROCEDURE — 36415 COLL VENOUS BLD VENIPUNCTURE: CPT

## 2018-10-16 PROCEDURE — G9016: CPT

## 2018-10-16 PROCEDURE — 81003 URINALYSIS AUTO W/O SCOPE: CPT | Mod: QW

## 2018-10-22 LAB
25(OH)D3 SERPL-MCNC: 32.6 NG/ML
ALBUMIN SERPL ELPH-MCNC: 4.7 G/DL
ALP BLD-CCNC: 60 U/L
ALT SERPL-CCNC: 20 U/L
ANACR T: NEGATIVE
ANION GAP SERPL CALC-SCNC: 14 MMOL/L
AST SERPL-CCNC: 19 U/L
BASOPHILS # BLD AUTO: 0.04 K/UL
BASOPHILS NFR BLD AUTO: 0.5 %
BILIRUB SERPL-MCNC: 0.3 MG/DL
BILIRUB UR QL STRIP: NEGATIVE
BUN SERPL-MCNC: 25 MG/DL
CALCIUM SERPL-MCNC: 9.8 MG/DL
CHLORIDE SERPL-SCNC: 99 MMOL/L
CHOLEST SERPL-MCNC: 194 MG/DL
CHOLEST/HDLC SERPL: 2.8 RATIO
CLARITY UR: CLEAR
CO2 SERPL-SCNC: 27 MMOL/L
COLLECTION METHOD: NORMAL
CREAT SERPL-MCNC: 0.61 MG/DL
EOSINOPHIL # BLD AUTO: 0.05 K/UL
EOSINOPHIL NFR BLD AUTO: 0.6 %
ERYTHROCYTE [SEDIMENTATION RATE] IN BLOOD BY WESTERGREN METHOD: 17 MM/HR
GLUCOSE SERPL-MCNC: 107 MG/DL
GLUCOSE UR-MCNC: NEGATIVE
HBA1C MFR BLD HPLC: 5.8 %
HCG UR QL: 0.2 EU/DL
HCT VFR BLD CALC: 46.6 %
HCV AB SER QL: NONREACTIVE
HCV S/CO RATIO: 0.28 S/CO
HDLC SERPL-MCNC: 69 MG/DL
HGB BLD-MCNC: 14.9 G/DL
HGB UR QL STRIP.AUTO: NORMAL
IMM GRANULOCYTES NFR BLD AUTO: 0.2 %
KETONES UR-MCNC: NEGATIVE
LDLC SERPL CALC-MCNC: 103 MG/DL
LEUKOCYTE ESTERASE UR QL STRIP: NEGATIVE
LYMPHOCYTES # BLD AUTO: 2.04 K/UL
LYMPHOCYTES NFR BLD AUTO: 23.4 %
MAN DIFF?: NORMAL
MCHC RBC-ENTMCNC: 31.8 PG
MCHC RBC-ENTMCNC: 32 GM/DL
MCV RBC AUTO: 99.6 FL
MONOCYTES # BLD AUTO: 0.65 K/UL
MONOCYTES NFR BLD AUTO: 7.5 %
NEUTROPHILS # BLD AUTO: 5.9 K/UL
NEUTROPHILS NFR BLD AUTO: 67.8 %
NITRITE UR QL STRIP: NEGATIVE
PH UR STRIP: 7
PLATELET # BLD AUTO: 308 K/UL
POTASSIUM SERPL-SCNC: 4.1 MMOL/L
PROT SERPL-MCNC: 7.3 G/DL
PROT UR STRIP-MCNC: NEGATIVE
RBC # BLD: 4.68 M/UL
RBC # FLD: 15.9 %
SODIUM SERPL-SCNC: 140 MMOL/L
SP GR UR STRIP: 1.01
T3 SERPL-MCNC: 118 NG/DL
T3RU NFR SERPL: 1.01 INDEX
T4 FREE SERPL-MCNC: 1.5 NG/DL
T4 SERPL-MCNC: 8.4 UG/DL
TRIGL SERPL-MCNC: 110 MG/DL
TSH SERPL-ACNC: 2.02 UIU/ML
WBC # FLD AUTO: 8.7 K/UL

## 2018-11-09 ENCOUNTER — APPOINTMENT (OUTPATIENT)
Dept: PULMONOLOGY | Facility: CLINIC | Age: 58
End: 2018-11-09
Payer: COMMERCIAL

## 2018-11-09 VITALS
HEART RATE: 97 BPM | SYSTOLIC BLOOD PRESSURE: 116 MMHG | TEMPERATURE: 100 F | DIASTOLIC BLOOD PRESSURE: 70 MMHG | OXYGEN SATURATION: 97 %

## 2018-11-09 PROCEDURE — 99214 OFFICE O/P EST MOD 30 MIN: CPT

## 2018-11-26 ENCOUNTER — TRANSCRIPTION ENCOUNTER (OUTPATIENT)
Age: 58
End: 2018-11-26

## 2019-01-22 ENCOUNTER — APPOINTMENT (OUTPATIENT)
Dept: PULMONOLOGY | Facility: CLINIC | Age: 59
End: 2019-01-22
Payer: COMMERCIAL

## 2019-01-22 VITALS
OXYGEN SATURATION: 97 % | BODY MASS INDEX: 26.21 KG/M2 | DIASTOLIC BLOOD PRESSURE: 80 MMHG | HEIGHT: 59 IN | HEART RATE: 88 BPM | SYSTOLIC BLOOD PRESSURE: 125 MMHG | WEIGHT: 130 LBS

## 2019-01-22 DIAGNOSIS — H49.23 SIXTH [ABDUCENT] NERVE PALSY, BILATERAL: ICD-10-CM

## 2019-01-22 DIAGNOSIS — M19.019 PRIMARY OSTEOARTHRITIS, UNSPECIFIED SHOULDER: ICD-10-CM

## 2019-01-22 PROCEDURE — 94060 EVALUATION OF WHEEZING: CPT

## 2019-01-22 PROCEDURE — 99406 BEHAV CHNG SMOKING 3-10 MIN: CPT

## 2019-01-22 PROCEDURE — 99214 OFFICE O/P EST MOD 30 MIN: CPT | Mod: 25

## 2019-01-22 RX ORDER — ACETAMINOPHEN AND CODEINE PHOSPHATE 300; 15 MG/1; MG/1
300-15 TABLET ORAL
Refills: 0 | Status: DISCONTINUED | COMMUNITY
End: 2019-01-22

## 2019-01-22 RX ORDER — TIOTROPIUM BROMIDE 18 UG/1
18 CAPSULE ORAL; RESPIRATORY (INHALATION)
Refills: 0 | Status: DISCONTINUED | COMMUNITY
End: 2019-01-22

## 2019-01-22 RX ORDER — PREDNISONE 5 MG/1
5 TABLET ORAL
Refills: 0 | Status: DISCONTINUED | COMMUNITY
End: 2019-01-22

## 2019-01-22 RX ORDER — PREDNISONE 5 MG/1
5 TABLET ORAL DAILY
Qty: 30 | Refills: 11 | Status: DISCONTINUED | COMMUNITY
Start: 2018-09-25 | End: 2019-01-22

## 2019-01-22 RX ORDER — HYDROXYCHLOROQUINE SULFATE 200 MG/1
200 TABLET ORAL
Refills: 0 | Status: DISCONTINUED | COMMUNITY
End: 2019-01-22

## 2019-01-22 NOTE — ASSESSMENT
[FreeTextEntry1] : Combination of underlying osteoarthritis and unspecified autoimmune disease. Has mild abnormal serology. most symptoms controlled at this point. . Will continue to follow. Needs to quit smoking but appears not motivated at this point\par \par Recommend lung cancer screening

## 2019-01-22 NOTE — PHYSICAL EXAM
[General Appearance - Well Developed] : well developed [Normal Appearance] : normal appearance [Well Groomed] : well groomed [General Appearance - Well Nourished] : well nourished [No Deformities] : no deformities [General Appearance - In No Acute Distress] : no acute distress [Normal Conjunctiva] : the conjunctiva exhibited no abnormalities [Eyelids - No Xanthelasma] : the eyelids demonstrated no xanthelasmas [Normal Oropharynx] : normal oropharynx [FreeTextEntry1] : shotty adenopathy [Heart Rate And Rhythm] : heart rate and rhythm were normal [Heart Sounds] : normal S1 and S2 [Murmurs] : no murmurs present [Respiration, Rhythm And Depth] : normal respiratory rhythm and effort [Exaggerated Use Of Accessory Muscles For Inspiration] : no accessory muscle use [Auscultation Breath Sounds / Voice Sounds] : lungs were clear to auscultation bilaterally [Abdomen Soft] : soft [Abdomen Tenderness] : non-tender [Abdomen Mass (___ Cm)] : no abdominal mass palpated [Abnormal Walk] : normal gait [Gait - Sufficient For Exercise Testing] : the gait was sufficient for exercise testing [Nail Clubbing] : no clubbing of the fingernails [Cyanosis, Localized] : no localized cyanosis [Petechial Hemorrhages (___cm)] : no petechial hemorrhages [Skin Color & Pigmentation] : normal skin color and pigmentation [] : no rash [No Venous Stasis] : no venous stasis [Skin Lesions] : no skin lesions [No Skin Ulcers] : no skin ulcer [No Xanthoma] : no  xanthoma was observed [Deep Tendon Reflexes (DTR)] : deep tendon reflexes were 2+ and symmetric [Sensation] : the sensory exam was normal to light touch and pinprick [No Focal Deficits] : no focal deficits [Oriented To Time, Place, And Person] : oriented to person, place, and time [Impaired Insight] : insight and judgment were intact [Affect] : the affect was normal

## 2019-01-24 LAB
ALBUMIN SERPL ELPH-MCNC: 4.3 G/DL
ALP BLD-CCNC: 55 U/L
ALT SERPL-CCNC: 15 U/L
ANION GAP SERPL CALC-SCNC: 10 MMOL/L
AST SERPL-CCNC: 18 U/L
BASOPHILS # BLD AUTO: 0.04 K/UL
BASOPHILS NFR BLD AUTO: 0.5 %
BILIRUB SERPL-MCNC: 0.2 MG/DL
BUN SERPL-MCNC: 21 MG/DL
CALCIUM SERPL-MCNC: 10.3 MG/DL
CHLORIDE SERPL-SCNC: 99 MMOL/L
CHOLEST SERPL-MCNC: 165 MG/DL
CHOLEST/HDLC SERPL: 2.6 RATIO
CO2 SERPL-SCNC: 26 MMOL/L
CREAT SERPL-MCNC: 0.67 MG/DL
CRP SERPL-MCNC: 0.95 MG/DL
EOSINOPHIL # BLD AUTO: 0.04 K/UL
EOSINOPHIL NFR BLD AUTO: 0.5 %
ERYTHROCYTE [SEDIMENTATION RATE] IN BLOOD BY WESTERGREN METHOD: 25 MM/HR
GLUCOSE SERPL-MCNC: 121 MG/DL
HCT VFR BLD CALC: 45.5 %
HDLC SERPL-MCNC: 64 MG/DL
HGB BLD-MCNC: 14.9 G/DL
IMM GRANULOCYTES NFR BLD AUTO: 0.3 %
LDLC SERPL CALC-MCNC: 90 MG/DL
LYMPHOCYTES # BLD AUTO: 2.08 K/UL
LYMPHOCYTES NFR BLD AUTO: 27 %
MAN DIFF?: NORMAL
MCHC RBC-ENTMCNC: 31.7 PG
MCHC RBC-ENTMCNC: 32.7 GM/DL
MCV RBC AUTO: 96.8 FL
MONOCYTES # BLD AUTO: 0.49 K/UL
MONOCYTES NFR BLD AUTO: 6.4 %
NEUTROPHILS # BLD AUTO: 5.04 K/UL
NEUTROPHILS NFR BLD AUTO: 65.3 %
PLATELET # BLD AUTO: 277 K/UL
POTASSIUM SERPL-SCNC: 3.9 MMOL/L
PROT SERPL-MCNC: 7.4 G/DL
RBC # BLD: 4.7 M/UL
RBC # FLD: 14.7 %
SODIUM SERPL-SCNC: 135 MMOL/L
TRIGL SERPL-MCNC: 53 MG/DL
WBC # FLD AUTO: 7.71 K/UL

## 2019-03-07 ENCOUNTER — TRANSCRIPTION ENCOUNTER (OUTPATIENT)
Age: 59
End: 2019-03-07

## 2019-04-13 ENCOUNTER — TRANSCRIPTION ENCOUNTER (OUTPATIENT)
Age: 59
End: 2019-04-13

## 2019-04-14 ENCOUNTER — TRANSCRIPTION ENCOUNTER (OUTPATIENT)
Age: 59
End: 2019-04-14

## 2019-04-14 ENCOUNTER — RX RENEWAL (OUTPATIENT)
Age: 59
End: 2019-04-14

## 2019-04-16 ENCOUNTER — APPOINTMENT (OUTPATIENT)
Dept: PULMONOLOGY | Facility: CLINIC | Age: 59
End: 2019-04-16

## 2019-05-08 ENCOUNTER — RX RENEWAL (OUTPATIENT)
Age: 59
End: 2019-05-08

## 2019-05-20 ENCOUNTER — APPOINTMENT (OUTPATIENT)
Dept: PULMONOLOGY | Facility: CLINIC | Age: 59
End: 2019-05-20
Payer: COMMERCIAL

## 2019-05-20 VITALS — HEART RATE: 86 BPM | DIASTOLIC BLOOD PRESSURE: 77 MMHG | SYSTOLIC BLOOD PRESSURE: 121 MMHG | OXYGEN SATURATION: 97 %

## 2019-05-20 LAB — TSH SERPL-ACNC: 2.08 UIU/ML

## 2019-05-20 PROCEDURE — 99406 BEHAV CHNG SMOKING 3-10 MIN: CPT

## 2019-05-20 PROCEDURE — 99214 OFFICE O/P EST MOD 30 MIN: CPT | Mod: 25

## 2019-05-20 PROCEDURE — 36415 COLL VENOUS BLD VENIPUNCTURE: CPT

## 2019-05-21 NOTE — REVIEW OF SYSTEMS
[Recent Wt Gain (___ Lbs)] : recent [unfilled] ~Ulb weight gain [FreeTextEntry2] : VISUAL CHANGE-SEEING OPTHO

## 2019-05-21 NOTE — ASSESSMENT
[FreeTextEntry1] : Overall stable except for issues of smoking and weight gain. Have counseled patient about both these issues. I explained to her that quitting smoking is the issue of greater priority and where she should place her attention.\par \par Followup labs in reference to autoimmune disease and to rule out diabetes in view of weight gain

## 2019-05-21 NOTE — HISTORY OF PRESENT ILLNESS
[FreeTextEntry1] : Followup for unspecified autoimmune disease. Has gained weight since last visit. Continues to smoke despite attempts at quitting

## 2019-06-06 LAB
ALBUMIN SERPL ELPH-MCNC: 4.4 G/DL
ALP BLD-CCNC: 58 U/L
ALT SERPL-CCNC: 16 U/L
ANION GAP SERPL CALC-SCNC: 13 MMOL/L
AST SERPL-CCNC: 17 U/L
BASOPHILS # BLD AUTO: 0.06 K/UL
BASOPHILS NFR BLD AUTO: 0.8 %
BILIRUB SERPL-MCNC: 0.3 MG/DL
BUN SERPL-MCNC: 21 MG/DL
CALCIUM SERPL-MCNC: 9.9 MG/DL
CHLORIDE SERPL-SCNC: 103 MMOL/L
CO2 SERPL-SCNC: 24 MMOL/L
CREAT SERPL-MCNC: 0.57 MG/DL
EOSINOPHIL # BLD AUTO: 0.2 K/UL
EOSINOPHIL NFR BLD AUTO: 2.7 %
ESTIMATED AVERAGE GLUCOSE: 111 MG/DL
GLUCOSE SERPL-MCNC: 110 MG/DL
HBA1C MFR BLD HPLC: 5.5 %
HCT VFR BLD CALC: 43.6 %
HGB BLD-MCNC: 14.4 G/DL
IMM GRANULOCYTES NFR BLD AUTO: 0.3 %
LYMPHOCYTES # BLD AUTO: 1.83 K/UL
LYMPHOCYTES NFR BLD AUTO: 24.3 %
MAN DIFF?: NORMAL
MCHC RBC-ENTMCNC: 31.9 PG
MCHC RBC-ENTMCNC: 33 GM/DL
MCV RBC AUTO: 96.7 FL
MONOCYTES # BLD AUTO: 0.56 K/UL
MONOCYTES NFR BLD AUTO: 7.4 %
NEUTROPHILS # BLD AUTO: 4.86 K/UL
NEUTROPHILS NFR BLD AUTO: 64.5 %
PLATELET # BLD AUTO: 278 K/UL
POTASSIUM SERPL-SCNC: 4.1 MMOL/L
PROT SERPL-MCNC: 6.7 G/DL
RBC # BLD: 4.51 M/UL
RBC # FLD: 13.7 %
SODIUM SERPL-SCNC: 140 MMOL/L
WBC # FLD AUTO: 7.53 K/UL

## 2019-07-01 ENCOUNTER — NON-APPOINTMENT (OUTPATIENT)
Age: 59
End: 2019-07-01

## 2019-07-01 ENCOUNTER — APPOINTMENT (OUTPATIENT)
Dept: OPHTHALMOLOGY | Facility: CLINIC | Age: 59
End: 2019-07-01
Payer: COMMERCIAL

## 2019-07-01 PROCEDURE — 92012 INTRM OPH EXAM EST PATIENT: CPT

## 2019-09-03 ENCOUNTER — APPOINTMENT (OUTPATIENT)
Dept: PULMONOLOGY | Facility: CLINIC | Age: 59
End: 2019-09-03
Payer: COMMERCIAL

## 2019-09-03 VITALS
OXYGEN SATURATION: 98 % | SYSTOLIC BLOOD PRESSURE: 104 MMHG | DIASTOLIC BLOOD PRESSURE: 69 MMHG | RESPIRATION RATE: 16 BRPM | HEART RATE: 91 BPM

## 2019-09-03 DIAGNOSIS — Z71.6 TOBACCO ABUSE COUNSELING: ICD-10-CM

## 2019-09-03 PROCEDURE — 99214 OFFICE O/P EST MOD 30 MIN: CPT | Mod: 25

## 2019-09-03 PROCEDURE — 36415 COLL VENOUS BLD VENIPUNCTURE: CPT

## 2019-09-03 PROCEDURE — 94060 EVALUATION OF WHEEZING: CPT

## 2019-09-04 PROBLEM — Z71.6 ENCOUNTER FOR SMOKING CESSATION COUNSELING: Status: ACTIVE | Noted: 2019-05-21

## 2019-09-04 LAB
ANION GAP SERPL CALC-SCNC: 13 MMOL/L
BASOPHILS # BLD AUTO: 0.07 K/UL
BASOPHILS NFR BLD AUTO: 0.8 %
BUN SERPL-MCNC: 31 MG/DL
CALCIUM SERPL-MCNC: 10.1 MG/DL
CHLORIDE SERPL-SCNC: 105 MMOL/L
CO2 SERPL-SCNC: 25 MMOL/L
CREAT SERPL-MCNC: 0.81 MG/DL
CRP SERPL-MCNC: 0.65 MG/DL
EOSINOPHIL # BLD AUTO: 0.15 K/UL
EOSINOPHIL NFR BLD AUTO: 1.7 %
ERYTHROCYTE [SEDIMENTATION RATE] IN BLOOD BY WESTERGREN METHOD: 14 MM/HR
GLUCOSE SERPL-MCNC: 89 MG/DL
HCT VFR BLD CALC: 41.8 %
HGB BLD-MCNC: 14.1 G/DL
IMM GRANULOCYTES NFR BLD AUTO: 0.2 %
LYMPHOCYTES # BLD AUTO: 3.71 K/UL
LYMPHOCYTES NFR BLD AUTO: 43.1 %
MAN DIFF?: NORMAL
MCHC RBC-ENTMCNC: 32.2 PG
MCHC RBC-ENTMCNC: 33.7 GM/DL
MCV RBC AUTO: 95.4 FL
MONOCYTES # BLD AUTO: 0.64 K/UL
MONOCYTES NFR BLD AUTO: 7.4 %
NEUTROPHILS # BLD AUTO: 4.01 K/UL
NEUTROPHILS NFR BLD AUTO: 46.8 %
PLATELET # BLD AUTO: 277 K/UL
POTASSIUM SERPL-SCNC: 4.4 MMOL/L
RBC # BLD: 4.38 M/UL
RBC # FLD: 13.7 %
SODIUM SERPL-SCNC: 143 MMOL/L
WBC # FLD AUTO: 8.6 K/UL

## 2019-09-04 NOTE — ASSESSMENT
[FreeTextEntry1] : A mild drop in diffusing capacity was noted. This may be related to concurrent cigarette smoking.. Her labs do not show evidence of active autoimmune disease.\par \par

## 2019-09-04 NOTE — HISTORY OF PRESENT ILLNESS
[FreeTextEntry1] : In general feels cough and breathing has been stable. Still smoking 1 ppd. did try a smoking cessation program and tried lozenges but got headaches, cant do patches, Is trying to vape now and asking if can use the lozenges .. \par on Plaquenil for eyes which has been stable, saw eye md recently\par \par no cough rare proair use

## 2019-09-04 NOTE — PROCEDURE
[FreeTextEntry1] : pulmonary function testing reveals drop in diffusing capacity\par \par CO2 level better\par venipuncture in office for labs\par \par \par \par 5-7 minute discussion with patient about smoking cessation was initiated with patient showing interest in attempting to quit smoking. Problems with risks of continued tobacco smoking including respiratory, cardiovascular, and oncological problems were discussed. Advice on smoking cessation was reiterated including methods of quitting, setting a date to quit, and different medicines and support systems available for smoking cessation was discussed. \par \par

## 2019-09-04 NOTE — PHYSICAL EXAM
[General Appearance - Well Developed] : well developed [Normal Appearance] : normal appearance [Well Groomed] : well groomed [General Appearance - Well Nourished] : well nourished [No Deformities] : no deformities [General Appearance - In No Acute Distress] : no acute distress [Normal Conjunctiva] : the conjunctiva exhibited no abnormalities [Eyelids - No Xanthelasma] : the eyelids demonstrated no xanthelasmas [Normal Oropharynx] : normal oropharynx [FreeTextEntry1] : shotty adenopathy [Heart Rate And Rhythm] : heart rate and rhythm were normal [Murmurs] : no murmurs present [Heart Sounds] : normal S1 and S2 [Respiration, Rhythm And Depth] : normal respiratory rhythm and effort [Exaggerated Use Of Accessory Muscles For Inspiration] : no accessory muscle use [Auscultation Breath Sounds / Voice Sounds] : lungs were clear to auscultation bilaterally [Abdomen Tenderness] : non-tender [Abdomen Soft] : soft [Abdomen Mass (___ Cm)] : no abdominal mass palpated [Abnormal Walk] : normal gait [Gait - Sufficient For Exercise Testing] : the gait was sufficient for exercise testing [Nail Clubbing] : no clubbing of the fingernails [Petechial Hemorrhages (___cm)] : no petechial hemorrhages [Cyanosis, Localized] : no localized cyanosis [Skin Color & Pigmentation] : normal skin color and pigmentation [] : no rash [No Venous Stasis] : no venous stasis [Skin Lesions] : no skin lesions [No Skin Ulcers] : no skin ulcer [No Xanthoma] : no  xanthoma was observed [Deep Tendon Reflexes (DTR)] : deep tendon reflexes were 2+ and symmetric [Sensation] : the sensory exam was normal to light touch and pinprick [No Focal Deficits] : no focal deficits [Oriented To Time, Place, And Person] : oriented to person, place, and time [Impaired Insight] : insight and judgment were intact [Affect] : the affect was normal

## 2019-09-04 NOTE — REVIEW OF SYSTEMS
[Recent Wt Gain (___ Lbs)] : no recent weight gain [Negative] : Sleep Disorder [FreeTextEntry2] : VISUAL CHANGE-SEEING OPTHO

## 2019-09-09 LAB
ANA SER IF-ACNC: NEGATIVE
ANACR T: NEGATIVE

## 2019-10-14 NOTE — ED CDU PROVIDER NOTE - GENITOURINARY [-], MLM
Patient called his Eliquis was filled by Dr. Bazan's office incorrectly, they gave him 5 mg tablets, he is supposed to take 2.5 mg. He said it is an odd shaped pill so he cannot cut it in half. He would like to know if we can send it to [facility] with the correct dose.   
no difficulty urinating

## 2019-12-10 ENCOUNTER — RX RENEWAL (OUTPATIENT)
Age: 59
End: 2019-12-10

## 2020-01-09 ENCOUNTER — APPOINTMENT (OUTPATIENT)
Dept: PULMONOLOGY | Facility: CLINIC | Age: 60
End: 2020-01-09
Payer: COMMERCIAL

## 2020-01-09 VITALS
DIASTOLIC BLOOD PRESSURE: 79 MMHG | OXYGEN SATURATION: 95 % | HEIGHT: 59 IN | HEART RATE: 85 BPM | RESPIRATION RATE: 14 BRPM | BODY MASS INDEX: 28.22 KG/M2 | SYSTOLIC BLOOD PRESSURE: 117 MMHG | WEIGHT: 140 LBS

## 2020-01-09 DIAGNOSIS — M16.9 OSTEOARTHRITIS OF HIP, UNSPECIFIED: ICD-10-CM

## 2020-01-09 PROCEDURE — 36415 COLL VENOUS BLD VENIPUNCTURE: CPT

## 2020-01-09 PROCEDURE — 99214 OFFICE O/P EST MOD 30 MIN: CPT | Mod: 25

## 2020-01-10 LAB
ALBUMIN SERPL ELPH-MCNC: 4.5 G/DL
ALP BLD-CCNC: 59 U/L
ALT SERPL-CCNC: 15 U/L
ANION GAP SERPL CALC-SCNC: 13 MMOL/L
AST SERPL-CCNC: 18 U/L
BASOPHILS # BLD AUTO: 0.1 K/UL
BASOPHILS NFR BLD AUTO: 0.9 %
BILIRUB SERPL-MCNC: 0.2 MG/DL
BUN SERPL-MCNC: 28 MG/DL
CALCIUM SERPL-MCNC: 9.7 MG/DL
CHLORIDE SERPL-SCNC: 100 MMOL/L
CK SERPL-CCNC: 105 U/L
CO2 SERPL-SCNC: 27 MMOL/L
CREAT SERPL-MCNC: 0.61 MG/DL
CRP SERPL-MCNC: 3.51 MG/DL
EOSINOPHIL # BLD AUTO: 0.16 K/UL
EOSINOPHIL NFR BLD AUTO: 1.5 %
ERYTHROCYTE [SEDIMENTATION RATE] IN BLOOD BY WESTERGREN METHOD: 45 MM/HR
ESTIMATED AVERAGE GLUCOSE: 114 MG/DL
GLUCOSE SERPL-MCNC: 113 MG/DL
HBA1C MFR BLD HPLC: 5.6 %
HCT VFR BLD CALC: 45.9 %
HGB BLD-MCNC: 14.6 G/DL
IMM GRANULOCYTES NFR BLD AUTO: 0.4 %
LYMPHOCYTES # BLD AUTO: 2.68 K/UL
LYMPHOCYTES NFR BLD AUTO: 24.4 %
MAN DIFF?: NORMAL
MCHC RBC-ENTMCNC: 31.5 PG
MCHC RBC-ENTMCNC: 31.8 GM/DL
MCV RBC AUTO: 98.9 FL
MONOCYTES # BLD AUTO: 1.03 K/UL
MONOCYTES NFR BLD AUTO: 9.4 %
NEUTROPHILS # BLD AUTO: 6.99 K/UL
NEUTROPHILS NFR BLD AUTO: 63.4 %
PLATELET # BLD AUTO: 283 K/UL
POTASSIUM SERPL-SCNC: 4.1 MMOL/L
PROT SERPL-MCNC: 7.1 G/DL
RBC # BLD: 4.64 M/UL
RBC # FLD: 13.7 %
RHEUMATOID FACT SER QL: 10 IU/ML
SODIUM SERPL-SCNC: 140 MMOL/L
WBC # FLD AUTO: 11 K/UL

## 2020-01-10 NOTE — PHYSICAL EXAM
[General Appearance - Well Developed] : well developed [Normal Appearance] : normal appearance [Well Groomed] : well groomed [General Appearance - In No Acute Distress] : no acute distress [General Appearance - Well Nourished] : well nourished [No Deformities] : no deformities [Normal Conjunctiva] : the conjunctiva exhibited no abnormalities [Normal Oropharynx] : normal oropharynx [Eyelids - No Xanthelasma] : the eyelids demonstrated no xanthelasmas [Heart Rate And Rhythm] : heart rate and rhythm were normal [Heart Sounds] : normal S1 and S2 [Murmurs] : no murmurs present [Respiration, Rhythm And Depth] : normal respiratory rhythm and effort [Exaggerated Use Of Accessory Muscles For Inspiration] : no accessory muscle use [Auscultation Breath Sounds / Voice Sounds] : lungs were clear to auscultation bilaterally [Abdomen Soft] : soft [Abdomen Tenderness] : non-tender [Abdomen Mass (___ Cm)] : no abdominal mass palpated [Abnormal Walk] : normal gait [Gait - Sufficient For Exercise Testing] : the gait was sufficient for exercise testing [Nail Clubbing] : no clubbing of the fingernails [Cyanosis, Localized] : no localized cyanosis [Petechial Hemorrhages (___cm)] : no petechial hemorrhages [Skin Color & Pigmentation] : normal skin color and pigmentation [No Venous Stasis] : no venous stasis [] : no rash [Skin Lesions] : no skin lesions [No Skin Ulcers] : no skin ulcer [Deep Tendon Reflexes (DTR)] : deep tendon reflexes were 2+ and symmetric [No Xanthoma] : no  xanthoma was observed [Sensation] : the sensory exam was normal to light touch and pinprick [No Focal Deficits] : no focal deficits [Impaired Insight] : insight and judgment were intact [Affect] : the affect was normal [Oriented To Time, Place, And Person] : oriented to person, place, and time [FreeTextEntry1] : shotty adenopathy

## 2020-01-10 NOTE — HISTORY OF PRESENT ILLNESS
[FreeTextEntry1] : Complaint of left groin pain while walking\par Some pain in right knee\par Some pain in left knee\par Symptoms last few days\par Relieved with naproxen

## 2020-01-10 NOTE — REVIEW OF SYSTEMS
[Negative] : Pulmonary Hypertension [Recent Wt Gain (___ Lbs)] : no recent weight gain [FreeTextEntry2] : VISUAL CHANGE-SEEING OPTHO

## 2020-01-10 NOTE — ADDENDUM
[FreeTextEntry1] : Labs demonstrate elevated sedimentation rate and CRP\par This would suggest that current symptoms are related to underlying autoimmune disease rather than osteoarthritis\par We'll refer to rheumatology and hold off on orthopedic evaluation

## 2020-01-10 NOTE — ASSESSMENT
[FreeTextEntry1] : \par The patient has history of autoimmune disease, recurrent complaint appears to be related to osteoarthritis\par Referral to orthopedics\par Check autoimmune serology

## 2020-01-12 ENCOUNTER — MOBILE ON CALL (OUTPATIENT)
Age: 60
End: 2020-01-12

## 2020-01-13 ENCOUNTER — TRANSCRIPTION ENCOUNTER (OUTPATIENT)
Age: 60
End: 2020-01-13

## 2020-01-13 LAB — ANACR T: NEGATIVE

## 2020-01-14 ENCOUNTER — TRANSCRIPTION ENCOUNTER (OUTPATIENT)
Age: 60
End: 2020-01-14

## 2020-01-17 ENCOUNTER — TRANSCRIPTION ENCOUNTER (OUTPATIENT)
Age: 60
End: 2020-01-17

## 2020-01-20 ENCOUNTER — TRANSCRIPTION ENCOUNTER (OUTPATIENT)
Age: 60
End: 2020-01-20

## 2020-02-11 ENCOUNTER — TRANSCRIPTION ENCOUNTER (OUTPATIENT)
Age: 60
End: 2020-02-11

## 2020-03-04 ENCOUNTER — TRANSCRIPTION ENCOUNTER (OUTPATIENT)
Age: 60
End: 2020-03-04

## 2020-03-06 ENCOUNTER — TRANSCRIPTION ENCOUNTER (OUTPATIENT)
Age: 60
End: 2020-03-06

## 2020-03-09 ENCOUNTER — TRANSCRIPTION ENCOUNTER (OUTPATIENT)
Age: 60
End: 2020-03-09

## 2020-03-12 ENCOUNTER — APPOINTMENT (OUTPATIENT)
Dept: PULMONOLOGY | Facility: CLINIC | Age: 60
End: 2020-03-12
Payer: COMMERCIAL

## 2020-03-12 VITALS
RESPIRATION RATE: 15 BRPM | DIASTOLIC BLOOD PRESSURE: 88 MMHG | HEART RATE: 93 BPM | SYSTOLIC BLOOD PRESSURE: 150 MMHG | TEMPERATURE: 99 F | OXYGEN SATURATION: 96 %

## 2020-03-12 DIAGNOSIS — Z87.09 PERSONAL HISTORY OF OTHER DISEASES OF THE RESPIRATORY SYSTEM: ICD-10-CM

## 2020-03-12 LAB
BASOPHILS # BLD AUTO: 0.03 K/UL
BASOPHILS NFR BLD AUTO: 0.4 %
EOSINOPHIL # BLD AUTO: 0.22 K/UL
EOSINOPHIL NFR BLD AUTO: 3 %
HCT VFR BLD CALC: 43.8 %
HGB BLD-MCNC: 14.9 G/DL
IMM GRANULOCYTES NFR BLD AUTO: 0.3 %
LYMPHOCYTES # BLD AUTO: 2.39 K/UL
LYMPHOCYTES NFR BLD AUTO: 32.9 %
MAN DIFF?: NORMAL
MCHC RBC-ENTMCNC: 32.2 PG
MCHC RBC-ENTMCNC: 34 GM/DL
MCV RBC AUTO: 94.6 FL
MONOCYTES # BLD AUTO: 0.48 K/UL
MONOCYTES NFR BLD AUTO: 6.6 %
NEUTROPHILS # BLD AUTO: 4.12 K/UL
NEUTROPHILS NFR BLD AUTO: 56.8 %
PLATELET # BLD AUTO: 270 K/UL
RBC # BLD: 4.63 M/UL
RBC # FLD: 13.2 %
WBC # FLD AUTO: 7.26 K/UL

## 2020-03-12 PROCEDURE — 94060 EVALUATION OF WHEEZING: CPT

## 2020-03-12 PROCEDURE — 36415 COLL VENOUS BLD VENIPUNCTURE: CPT

## 2020-03-12 PROCEDURE — 99214 OFFICE O/P EST MOD 30 MIN: CPT | Mod: 25

## 2020-03-12 PROCEDURE — 71046 X-RAY EXAM CHEST 2 VIEWS: CPT

## 2020-03-13 ENCOUNTER — TRANSCRIPTION ENCOUNTER (OUTPATIENT)
Age: 60
End: 2020-03-13

## 2020-03-13 LAB
ALBUMIN SERPL ELPH-MCNC: 4.7 G/DL
ALP BLD-CCNC: 58 U/L
ALT SERPL-CCNC: 17 U/L
ANION GAP SERPL CALC-SCNC: 14 MMOL/L
AST SERPL-CCNC: 18 U/L
BILIRUB SERPL-MCNC: 0.3 MG/DL
BUN SERPL-MCNC: 18 MG/DL
CALCIUM SERPL-MCNC: 10.1 MG/DL
CHLORIDE SERPL-SCNC: 96 MMOL/L
CO2 SERPL-SCNC: 26 MMOL/L
CREAT SERPL-MCNC: 0.6 MG/DL
CRP SERPL-MCNC: 2.1 MG/DL
ERYTHROCYTE [SEDIMENTATION RATE] IN BLOOD BY WESTERGREN METHOD: 50 MM/HR
POTASSIUM SERPL-SCNC: 4.3 MMOL/L
PROT SERPL-MCNC: 7.1 G/DL
RAPID RVP RESULT: DETECTED
RV+EV RNA SPEC QL NAA+PROBE: DETECTED
SODIUM SERPL-SCNC: 136 MMOL/L

## 2020-03-13 NOTE — PROCEDURE
[FreeTextEntry1] : Chest x-ray negative\par Spirometry normal with mild interval decline compared to prior

## 2020-03-13 NOTE — HISTORY OF PRESENT ILLNESS
[Current] : current [TextBox_4] : Visited ENT 3/3 was told had sinus infection, given a zpack started it 3/7. Finished zpack 3/11\par Still with PND, cough, yellow mucus, low grade fever in office\par \par History of unspecified autoimmune disease\par Extensive workup by Dr. Reyes-patient brought in labs February 2020 all negative

## 2020-03-13 NOTE — REVIEW OF SYSTEMS
[Fever] : fever [Fatigue] : fatigue [Poor Appetite] : poor appetite [Sore Throat] : sore throat [Cough] : cough [Negative] : Endocrine

## 2020-03-13 NOTE — ASSESSMENT
[FreeTextEntry1] : Most likely acute bronchitis\par Appears to be improving\par Note increase in ESR and CRP-will send her to rheumatologist\par Probably reflects acute illness rather than underlying autoimmune disease but should be followed

## 2020-03-13 NOTE — PHYSICAL EXAM
[No Acute Distress] : no acute distress [Normal Oropharynx] : normal oropharynx [Normal Appearance] : normal appearance [No Neck Mass] : no neck mass [Normal Rate/Rhythm] : normal rate/rhythm [Normal S1, S2] : normal s1, s2 [No Murmurs] : no murmurs [No Abnormalities] : no abnormalities [Benign] : benign [Normal Gait] : normal gait [No Clubbing] : no clubbing [No Cyanosis] : no cyanosis [No Edema] : no edema [FROM] : FROM [Normal Color/ Pigmentation] : normal color/ pigmentation [No Focal Deficits] : no focal deficits [Oriented x3] : oriented x3 [Normal Affect] : normal affect [TextBox_68] : Bilateral rhonchi

## 2020-03-16 ENCOUNTER — APPOINTMENT (OUTPATIENT)
Dept: RHEUMATOLOGY | Facility: CLINIC | Age: 60
End: 2020-03-16

## 2020-03-16 ENCOUNTER — TRANSCRIPTION ENCOUNTER (OUTPATIENT)
Age: 60
End: 2020-03-16

## 2020-03-24 ENCOUNTER — RX RENEWAL (OUTPATIENT)
Age: 60
End: 2020-03-24

## 2020-03-30 ENCOUNTER — TRANSCRIPTION ENCOUNTER (OUTPATIENT)
Age: 60
End: 2020-03-30

## 2020-04-08 ENCOUNTER — TRANSCRIPTION ENCOUNTER (OUTPATIENT)
Age: 60
End: 2020-04-08

## 2020-04-10 ENCOUNTER — APPOINTMENT (OUTPATIENT)
Dept: PULMONOLOGY | Facility: CLINIC | Age: 60
End: 2020-04-10
Payer: COMMERCIAL

## 2020-04-10 DIAGNOSIS — R68.89 OTHER GENERAL SYMPTOMS AND SIGNS: ICD-10-CM

## 2020-04-10 PROCEDURE — 99213 OFFICE O/P EST LOW 20 MIN: CPT | Mod: 95

## 2020-04-10 NOTE — HISTORY OF PRESENT ILLNESS
[TextBox_4] : This visit was provided via telehealth using real-time 2-way audio visual technology. The patient, MILDRED RESENDEZ , was [place] [id number] [place] [address]  at the time of the visit.\par The provider, Adam Tabor, was located at office [address] [place] [address] at the time of the visit. \par \par  The patient, Ms. MILDRED RESENDEZ  and Physician Adam Patterson participated in the telehealth encounter.\par \par Verbal consent obtained by  from patient\par \par Had temps up and down .7 through day and week\par Had body aches.\par Ill for 1 week.\par Some H/A\par \par Saw Dr Chavis end of march with bronchitis. 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 \par \par Not tested for COVID. No SOB. Occ. cough with phlegm. Has improved. \par Feels throbbing in ear and sinus congestion. \par \[facility] Took Zithro early march.

## 2020-04-10 NOTE — ASSESSMENT
[FreeTextEntry1] : Course of Augmentin BID with food for possible sinusitis.\par Rest home.\par \par Based upon symptom complex there is a possibility that patient is infected with coronavirus. Based upon current recommendations and due to the fact that She  is doing well without significant respiratory symptoms I have recommended that the patient remain home in isolation. Should She  develop increasing respiratory symptoms this patient will call or go to the emergency room and COVID testing can be performed at that time.\par \par Continue present care with rest home, fluids, PRN Tylenol.\par \par  Ms. MILDRED RESENDEZ has been advised not to return to work or regular activities until asymptomatic, without fever for 3 days,  for a total quarantine duration of minimal of 7 days..

## 2020-04-13 ENCOUNTER — TRANSCRIPTION ENCOUNTER (OUTPATIENT)
Age: 60
End: 2020-04-13

## 2020-04-14 ENCOUNTER — TRANSCRIPTION ENCOUNTER (OUTPATIENT)
Age: 60
End: 2020-04-14

## 2020-04-16 ENCOUNTER — TRANSCRIPTION ENCOUNTER (OUTPATIENT)
Age: 60
End: 2020-04-16

## 2020-04-21 ENCOUNTER — TRANSCRIPTION ENCOUNTER (OUTPATIENT)
Age: 60
End: 2020-04-21

## 2020-07-06 ENCOUNTER — TRANSCRIPTION ENCOUNTER (OUTPATIENT)
Age: 60
End: 2020-07-06

## 2020-07-07 NOTE — ED CDU PROVIDER NOTE - CROS ED NEURO POS
Otolaryngologist Procedure Text (D): After obtaining clear surgical margins the patient was sent to otolaryngology for surgical repair.  The patient understands they will receive post-surgical care and follow-up from the referring physician's office. DIFFICULTY WALKING / IMBALANCE/SEIZURES

## 2020-07-17 ENCOUNTER — APPOINTMENT (OUTPATIENT)
Dept: PULMONOLOGY | Facility: CLINIC | Age: 60
End: 2020-07-17
Payer: COMMERCIAL

## 2020-07-17 VITALS
OXYGEN SATURATION: 100 % | HEART RATE: 78 BPM | RESPIRATION RATE: 15 BRPM | TEMPERATURE: 98.2 F | SYSTOLIC BLOOD PRESSURE: 130 MMHG | DIASTOLIC BLOOD PRESSURE: 77 MMHG

## 2020-07-17 PROCEDURE — 99213 OFFICE O/P EST LOW 20 MIN: CPT | Mod: 25

## 2020-07-17 PROCEDURE — 99406 BEHAV CHNG SMOKING 3-10 MIN: CPT

## 2020-07-17 PROCEDURE — 36415 COLL VENOUS BLD VENIPUNCTURE: CPT

## 2020-07-17 NOTE — PHYSICAL EXAM
[No Acute Distress] : no acute distress [Normal Oropharynx] : normal oropharynx [Normal Appearance] : normal appearance [No Neck Mass] : no neck mass [Normal Rate/Rhythm] : normal rate/rhythm [Normal S1, S2] : normal s1, s2 [No Murmurs] : no murmurs [No Resp Distress] : no resp distress [Clear to Auscultation Bilaterally] : clear to auscultation bilaterally [No Abnormalities] : no abnormalities [Benign] : benign [Normal Gait] : normal gait [No Clubbing] : no clubbing [No Cyanosis] : no cyanosis [No Edema] : no edema [Normal Color/ Pigmentation] : normal color/ pigmentation [FROM] : FROM [No Focal Deficits] : no focal deficits [Oriented x3] : oriented x3 [Normal Affect] : normal affect

## 2020-07-20 LAB
ALBUMIN SERPL ELPH-MCNC: 4.9 G/DL
ALP BLD-CCNC: 57 U/L
ALT SERPL-CCNC: 18 U/L
ANA SER IF-ACNC: NEGATIVE
ANION GAP SERPL CALC-SCNC: 18 MMOL/L
AST SERPL-CCNC: 18 U/L
BASOPHILS # BLD AUTO: 0.08 K/UL
BASOPHILS NFR BLD AUTO: 1.1 %
BILIRUB SERPL-MCNC: 0.2 MG/DL
BUN SERPL-MCNC: 26 MG/DL
CALCIUM SERPL-MCNC: 10.2 MG/DL
CHLORIDE SERPL-SCNC: 100 MMOL/L
CO2 SERPL-SCNC: 21 MMOL/L
CREAT SERPL-MCNC: 0.63 MG/DL
CRP SERPL-MCNC: 0.16 MG/DL
EOSINOPHIL # BLD AUTO: 0.08 K/UL
EOSINOPHIL NFR BLD AUTO: 1.1 %
ERYTHROCYTE [SEDIMENTATION RATE] IN BLOOD BY WESTERGREN METHOD: 34 MM/HR
GLUCOSE SERPL-MCNC: 98 MG/DL
HCT VFR BLD CALC: 47.4 %
HGB BLD-MCNC: 15.4 G/DL
IMM GRANULOCYTES NFR BLD AUTO: 0.3 %
LYMPHOCYTES # BLD AUTO: 2.49 K/UL
LYMPHOCYTES NFR BLD AUTO: 34 %
MAN DIFF?: NORMAL
MCHC RBC-ENTMCNC: 31.2 PG
MCHC RBC-ENTMCNC: 32.5 GM/DL
MCV RBC AUTO: 96 FL
MONOCYTES # BLD AUTO: 0.54 K/UL
MONOCYTES NFR BLD AUTO: 7.4 %
NEUTROPHILS # BLD AUTO: 4.12 K/UL
NEUTROPHILS NFR BLD AUTO: 56.1 %
PLATELET # BLD AUTO: 268 K/UL
POTASSIUM SERPL-SCNC: 4.3 MMOL/L
PROT SERPL-MCNC: 7 G/DL
RBC # BLD: 4.94 M/UL
RBC # FLD: 13.5 %
SARS-COV-2 IGG SERPL IA-ACNC: <0.1 INDEX
SARS-COV-2 IGG SERPL QL IA: NEGATIVE
SARS-COV-2 N GENE NPH QL NAA+PROBE: NOT DETECTED
SODIUM SERPL-SCNC: 139 MMOL/L
WBC # FLD AUTO: 7.33 K/UL

## 2020-07-22 ENCOUNTER — APPOINTMENT (OUTPATIENT)
Dept: PULMONOLOGY | Facility: CLINIC | Age: 60
End: 2020-07-22
Payer: COMMERCIAL

## 2020-07-22 VITALS
HEART RATE: 77 BPM | RESPIRATION RATE: 12 BRPM | TEMPERATURE: 98.7 F | SYSTOLIC BLOOD PRESSURE: 126 MMHG | DIASTOLIC BLOOD PRESSURE: 74 MMHG | WEIGHT: 152 LBS | BODY MASS INDEX: 30.64 KG/M2 | OXYGEN SATURATION: 97 % | HEIGHT: 59 IN

## 2020-07-22 PROCEDURE — 94727 GAS DIL/WSHOT DETER LNG VOL: CPT

## 2020-07-22 PROCEDURE — 94729 DIFFUSING CAPACITY: CPT

## 2020-07-22 PROCEDURE — 94060 EVALUATION OF WHEEZING: CPT

## 2020-07-23 NOTE — HISTORY OF PRESENT ILLNESS
[Current] : current [Never] : never [TextBox_4] : MILDRED 60 year female  came to the office today offered no cc today feels well and is in her usual state of health. \par has hx of abnormal PFT \par and clinically stable autoimmune disease. She said she tested  Covid 19 antibody neg  but is unsure if that was an accurate test as it was close to when she was not feeling well. \par Mildred continues to smoke against medical advice. \par \par

## 2020-07-23 NOTE — COUNSELING
[Sleep ___ hours/day] : Sleep [unfilled] hours/day [Engage in a relaxing activity] : Engage in a relaxing activity [Tobacco Use Cessation Intermediate Greater Than 3 Minutes Up to 10 Minutes] : Tobacco Use Cessation Intermediate Greater Than 3 Minutes Up to 10 Minutes [None] : None [Good understanding] : Patient has a good understanding of lifestyle changes and steps needed to achieve self management goal [FreeTextEntry1] : 5

## 2020-10-01 ENCOUNTER — TRANSCRIPTION ENCOUNTER (OUTPATIENT)
Age: 60
End: 2020-10-01

## 2020-10-22 ENCOUNTER — TRANSCRIPTION ENCOUNTER (OUTPATIENT)
Age: 60
End: 2020-10-22

## 2020-11-05 NOTE — ED ADULT NURSE NOTE - CAS ELECT INFOMATION PROVIDED
[facility]  Plastic & Reconstructive Surgery  Sin Khanna M.D., M.S.  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 Jossy Pike.  Patient was initially seen by Dr Wanda Liriano with biopsy performed 9/14/2020 (scanned to media).  Jay reports an extensive history of previous skin cancer excisions including forehead, left cheek, scalp.    Skin neoplasm specific history:  - location: Right cheek, present last weeks  - change in shape: Yes  - change in color: Yes  - bleeds spontaneously: Yes  Sun exposure history:  - history of increased sun exposure over lifetime: Yes  - regularly uses sun barrier protection/sunscreen: No  - history of indoor tanning: No  - blistering sunburns as a child (<18yrs): No   Personal history of skin cancer: yes  Family history of skin cancer: unknown  History of exposure to hazardous materials: no   Denies history of weight loss, fevers, chills, malaise.    HISTORIES:  Past Medical History:   Diagnosis Date   • Basal cell carcinoma    • Essential (primary) hypertension    • Gastroesophageal reflux disease    • High cholesterol    • Thyroid condition      Refer to scanned intake form with patient reported history  Past Surgical History:   Procedure Laterality Date   • Appendectomy     • Cholecystectomy     • Exc tumor soft tisue face scalp subcut <2cm     • Removal gallbladder     • Surgery & follow-up  11/05/2020    Mohs reconstruction right cheek, possible adjacent tissue transfer, possible skin graft     Refer to scanned intake form with patient reported surgeries  ALLERGIES:  No Known Allergies  Current Facility-Administered  Medications   Medication Dose Route Frequency Provider Last Rate Last Dose   • ceFAZolin (ANCEF) syringe 2,000 mg  2,000 mg Intravenous On Call to Procedure Sin Khanna MD       • lidocaine-sodium bicarbonate 0.9-8.4% for J-tip administration 0.5-1 mL  0.5-1 mL Subcutaneous PRN Duncan Hernandez MD        Or   • lidocaine 1 % injection 5-10 mg  5-10 mg Subcutaneous PRN Duncan Hernandez MD       • famotidine (PEPCID) injection 20 mg  20 mg Intravenous On Call to Procedure Duncan Hernandez MD       • lactated ringers infusion   Intravenous Continuous Duncan Hernandez MD 10 mL/hr at 11/05/20 1618     • sodium chloride 0.9% infusion   Intravenous Continuous Duncan Hernandez MD       • labetalol (NORMODYNE) injection 10 mg  10 mg Intravenous Once Duncan Hernandez MD         Social History     Tobacco Use   Smoking Status Never Smoker   Smokeless Tobacco Never Used     Social History     Substance and Sexual Activity   Alcohol Use Not Currently     Family History   Problem Relation Age of Onset   • Coronary Artery Disease Father       Refer to scanned intake form detailing patient reported family history    REVIEW OF SYSTEMS:   Review of Systems   Constitutional: Negative.    HENT: Negative.    Eyes: Negative.    Respiratory: Negative.    Cardiovascular: Negative.    Gastrointestinal: Negative.    Endocrine: Negative.    Genitourinary: Negative.    Musculoskeletal: Negative.    Skin: Negative.    Allergic/Immunologic: Negative.    Neurological: Negative.    Hematological: Negative.    Psychiatric/Behavioral: Negative.       All systems reviewed, negative except as noted in HPI   Refer to scanned intake form detailing patient ROS    PHYSICAL EXAM:  Wt Readings from Last 1 Encounters:   11/05/20 80.7 kg (177 lb 14.6 oz)          Body surface area is 1.96 meters squared.  Body mass index is 26.5 kg/m².    Physical Exam  Constitutional:       Appearance: Normal appearance. He is well-developed.   HENT:      Head: Normocephalic and atraumatic.       Comments: Approximately 1.5 x 1.0 cm full-thickness defect right lower eyelid/lid cheek junction.     Mouth/Throat:      Mouth: Mucous membranes are moist.   Eyes:      Extraocular Movements: Extraocular movements intact.      Conjunctiva/sclera: Conjunctivae normal.   Neck:      Musculoskeletal: Neck supple.   Cardiovascular:      Rate and Rhythm: Regular rhythm.   Pulmonary:      Effort: Pulmonary effort is normal. No respiratory distress.      Breath sounds: Normal breath sounds.   Abdominal:      Palpations: Abdomen is soft.   Skin:     General: Skin is warm.   Neurological:      Mental Status: He is alert and oriented to person, place, and time.   Psychiatric:         Mood and Affect: Mood normal.         Behavior: Behavior normal.         Thought Content: Thought content normal.             ASSESSMENT:  Squamous cell carcinoma in situ (SCCIS) of skin of cheek    PATIENT COUNSELIN year old male referred by Dr.Meghan Pike following Mohs excision right malar cheek/lid cheek junction.  Presents today to discuss options for surgical management.    I discussed with Maricel multiple reconstructive options including adjacent tissue transfer, complex closure or skin graft.  The wound will be evaluated intraoperatively to determine the most appropriate means of closure.  Indications, surgical technique, expected immediate long-term outcomes discussed.  All questions answered.    Risks and benefits for right cheek reconstruction of Mohs defect with possible complex closure, adjacent tissue transfer or skin graft were discussed including but not limited to: Infection, bleeding, delayed surgical wound healing, scarring, scar contracture, lower eyelid malposition, lagophthalmos, secondary systemic complications of surgery, complications of general anesthesia.  All questions answered.  Written informed consent obtained.  Patient verbally understanding.  Cutaneous neoplasm marked in preoperative holding area patient  concordance.    Plan:  1.  Right cheek Mohs defect closure with possible complex closure, possible adjacent tissue transfer, possible full-thickness skin graft under general anesthesia.    Plan of care also discussed with patient spouse Danica, with patient permission.  Time spent in excess of 45 minutes including greater than 50% spent with direct face-to-face patient education.     Sin Khanna MD       DC instructions

## 2020-12-23 ENCOUNTER — TRANSCRIPTION ENCOUNTER (OUTPATIENT)
Age: 60
End: 2020-12-23

## 2020-12-23 PROBLEM — Z87.09 HISTORY OF ACUTE BRONCHITIS: Status: RESOLVED | Noted: 2020-03-13 | Resolved: 2020-12-23

## 2021-01-13 ENCOUNTER — TRANSCRIPTION ENCOUNTER (OUTPATIENT)
Age: 61
End: 2021-01-13

## 2021-01-25 ENCOUNTER — TRANSCRIPTION ENCOUNTER (OUTPATIENT)
Age: 61
End: 2021-01-25

## 2021-01-29 ENCOUNTER — APPOINTMENT (OUTPATIENT)
Dept: COLORECTAL SURGERY | Facility: CLINIC | Age: 61
End: 2021-01-29
Payer: COMMERCIAL

## 2021-01-29 VITALS
DIASTOLIC BLOOD PRESSURE: 80 MMHG | HEIGHT: 59.25 IN | RESPIRATION RATE: 14 BRPM | TEMPERATURE: 37.5 F | WEIGHT: 123 LBS | SYSTOLIC BLOOD PRESSURE: 120 MMHG | BODY MASS INDEX: 24.8 KG/M2 | HEART RATE: 64 BPM

## 2021-01-29 DIAGNOSIS — Z82.69 FAMILY HISTORY OF OTHER DISEASES OF THE MUSCULOSKELETAL SYSTEM AND CONNECTIVE TISSUE: ICD-10-CM

## 2021-01-29 DIAGNOSIS — Z82.0 FAMILY HISTORY OF EPILEPSY AND OTHER DISEASES OF THE NERVOUS SYSTEM: ICD-10-CM

## 2021-01-29 PROCEDURE — 99243 OFF/OP CNSLTJ NEW/EST LOW 30: CPT

## 2021-01-29 PROCEDURE — 99072 ADDL SUPL MATRL&STAF TM PHE: CPT

## 2021-01-29 RX ORDER — AMOXICILLIN AND CLAVULANATE POTASSIUM 875; 125 MG/1; MG/1
875-125 TABLET, COATED ORAL TWICE DAILY
Qty: 14 | Refills: 0 | Status: DISCONTINUED | COMMUNITY
Start: 2020-04-10 | End: 2021-01-29

## 2021-01-29 RX ORDER — FLUTICASONE PROPIONATE 50 MCG
50 SPRAY, SUSPENSION NASAL
Refills: 0 | Status: DISCONTINUED | COMMUNITY
End: 2021-01-29

## 2021-01-29 RX ORDER — NAPROXEN SODIUM 220 MG
TABLET ORAL
Refills: 0 | Status: ACTIVE | COMMUNITY

## 2021-02-05 NOTE — ASSESSMENT
[FreeTextEntry1] : 60-year-old female presents for consultation regarding management of her recent episode of abdominal pain associated with nausea and vomiting.\par \par Patient has a long-standing history of what appears to be slow transit idiopathic constipation. She had a colonoscopy over 5 years ago which revealed no mechanical blockages. She had been placed on Linzess with some relief. Approximately 3 weeks ago she developed progressive abdominal distention and cessation of bowel function. She eventually developed feculent vomiting. This took approximately 3-4 days to resolve. Eventually she was seen by GI and a CAT scan was performed. There was some small bowel dilatation noted and the colon was collapsed. No specific transition point was seen. No internal hernia was evident.\par \par Patient's past surgical history is significant for a salpingo-oophorectomy seemingly related PID. 20-30 years ago she underwent 2 different exploratory laparotomies and lysis of adhesions for bowel obstruction.\par \par This is a difficult situation. Patient is known to have chronic constipation and has had small bowel obstructions in the past. The most recent CAT scan does not show a specific transition point pinpointing a site of small bowel obstruction. It should be remembered that this scan was done 4-5 days after the acute episode and the problem had already resolved.\par \par It is well known that surgery alone can lead to additional adhesions. I am concerned that performing a lysis of adhesions at this point without any specific obstruction point may not relieve her problem.\par \par For now I have advised her to go back on her Linzess. I want her to try to expand her diet. If she develops recurrent signs and symptoms of a small bowel obstruction, I have advised her to go expeditiously to the emergency room at [facility]. We then can perform a repeat CAT scan and see if a specific transition point can be identified.

## 2021-02-05 NOTE — HISTORY OF PRESENT ILLNESS
[FreeTextEntry1] : 61yo WF with longstanding hx constipation (BM 1-2x/weekly).  Pt became dependent on OTC laxatives years ago. Approx 5yrs ago Linzess was initiated/pt increased fluid&fiber in diet with significant improvement in bowel habits.\par \par Jan 8, 2021 developed significant cramping (pt hadnt had BM in 4 days). Vomited feculent material. Two days later had multiple diarrheal BMs. Few days later seen by GI. Advised to schedule abdominal CT, which was performed 4 days later. Denies any additional SB studies/manometry studies\par \par Since then pt has significantly altered diet (soups, soft food). Pt advised to stop Linzess. Last BM @4 days ago (nati/diarrhea in consistency). Abdominal gurgling. +flatus.\par \par Last colonoscopy 5yrs ago.\par \par Pt with remote hx PID. Underwent exlap/tubal repair in 1987. Laparoscopic MARICHUY x2 in early 1990s.\par \par

## 2021-02-05 NOTE — PHYSICAL EXAM
[Normal Breath Sounds] : Normal breath sounds [Normal Heart Sounds] : normal heart sounds [Normal Rate and Rhythm] : normal rate and rhythm [No Edema] : No edema [Alert] : alert [Oriented to Person] : oriented to person [Oriented to Place] : oriented to place [Oriented to Time] : oriented to time [de-identified] : soft +BS NT/ND Pfann scar [de-identified] : NC/AT [de-identified] : +ROM [de-identified] : intact

## 2021-02-05 NOTE — REVIEW OF SYSTEMS
[Recent Weight Loss (___ Lbs)] : recent [unfilled] ~Ulb weight loss [As Noted in HPI] : as noted in HPI [Joint Pain] : joint pain [Depression] : depression [Negative] : Endocrine [Chest Pain] : no chest pain [Lower Ext Edema] : no lower extremity edema [Shortness Of Breath] : no shortness of breath [Easy Bleeding] : no tendency for easy bleeding [Easy Bruising] : no tendency for easy bruising [FreeTextEntry2] : 6lb wt loss [FreeTextEntry8] : overactive bladder. Treated with botox

## 2021-02-07 ENCOUNTER — TRANSCRIPTION ENCOUNTER (OUTPATIENT)
Age: 61
End: 2021-02-07

## 2021-04-02 ENCOUNTER — APPOINTMENT (OUTPATIENT)
Dept: GASTROENTEROLOGY | Facility: CLINIC | Age: 61
End: 2021-04-02
Payer: COMMERCIAL

## 2021-04-02 VITALS
DIASTOLIC BLOOD PRESSURE: 70 MMHG | HEIGHT: 59 IN | HEART RATE: 80 BPM | BODY MASS INDEX: 25 KG/M2 | TEMPERATURE: 98.6 F | SYSTOLIC BLOOD PRESSURE: 120 MMHG | OXYGEN SATURATION: 98 % | WEIGHT: 124 LBS

## 2021-04-02 PROCEDURE — 99072 ADDL SUPL MATRL&STAF TM PHE: CPT

## 2021-04-02 PROCEDURE — 99212 OFFICE O/P EST SF 10 MIN: CPT

## 2021-04-02 NOTE — REVIEW OF SYSTEMS
[Negative] : Cardiovascular [FreeTextEntry6] : Mild shortness of breath on exertion secondary to tobacco use [FreeTextEntry7] : Mild abdominal bloating with occasional constipation and hard stool.

## 2021-04-02 NOTE — ASSESSMENT
[FreeTextEntry1] : Is a 60-year-old female who had a self-limiting episode of small bowel obstruction she saw her surgeon who felt that no surgical intervention was indicated at the present time.  He does have colonic hypomotility however the transition zone was not suggestive of a colonic pathology.  She will continue to use Linzess which she has used in the past we will be careful since she did have a history of small bowel obstruction but currently I do not feel that there is any fixed narrowing of her small intestine.  Call immediately for any recurrence of vomiting or abdominal pain.  Told to increase her water intake and stop smoking.  Patient will need a colonoscopy as part of a colorectal cancer screening and promised she would schedule it once she retires from work several months from now.

## 2021-04-02 NOTE — HISTORY OF PRESENT ILLNESS
[FreeTextEntry1] : The patient was seen in January and at that time she had significant abdominal distention with an episode of feculent vomiting.  Was felt that she had a small bowel obstruction and in fact was sent for a CAT scan which confirmed the diagnosis.  Hesitant to go to the hospital and started to feel better when she started moving her bowels there was no further vomiting and the small bowel obstruction resolved.  She saw her surgeon in this regard who felt that no surgical intervention was necessary at that time.  She suffers from chronic idiopathic constipation and notices that she has abdominal bloating at times.  May skip days of the stool, very hard.  Has a history of lung issues and continues to smoke.  Does not abuse caffeine or ethanol.  Colonoscopy done by another physician many years ago.  Currently she denies any significant pain except for the fact that her bowels are still sluggish and she takes Linzess 2 mcg/day.

## 2021-04-02 NOTE — PHYSICAL EXAM
[General Appearance - Alert] : alert [] : no respiratory distress [Respiration, Rhythm And Depth] : normal respiratory rhythm and effort [Auscultation Breath Sounds / Voice Sounds] : lungs were clear to auscultation bilaterally [Apical Impulse] : the apical impulse was normal [Heart Rate And Rhythm] : heart rate was normal and rhythm regular [Heart Sounds] : normal S1 and S2 [Bowel Sounds] : normal bowel sounds [Abdomen Soft] : soft [Abdomen Tenderness] : non-tender [FreeTextEntry1] : Mild distention and hyper tympanism with no discomfort and normoactive bowel sounds.

## 2021-04-26 ENCOUNTER — TRANSCRIPTION ENCOUNTER (OUTPATIENT)
Age: 61
End: 2021-04-26

## 2021-04-26 RX ORDER — HYDROXYCHLOROQUINE SULFATE 200 MG/1
200 TABLET, FILM COATED ORAL
Qty: 180 | Refills: 3 | Status: ACTIVE | COMMUNITY
Start: 2018-10-14 | End: 1900-01-01

## 2021-09-03 ENCOUNTER — APPOINTMENT (OUTPATIENT)
Dept: GASTROENTEROLOGY | Facility: CLINIC | Age: 61
End: 2021-09-03
Payer: COMMERCIAL

## 2021-09-03 VITALS
TEMPERATURE: 97.5 F | DIASTOLIC BLOOD PRESSURE: 70 MMHG | HEIGHT: 59.5 IN | OXYGEN SATURATION: 99 % | WEIGHT: 136 LBS | SYSTOLIC BLOOD PRESSURE: 119 MMHG | HEART RATE: 82 BPM | BODY MASS INDEX: 27.06 KG/M2

## 2021-09-03 DIAGNOSIS — Z82.69 FAMILY HISTORY OF OTHER DISEASES OF THE MUSCULOSKELETAL SYSTEM AND CONNECTIVE TISSUE: ICD-10-CM

## 2021-09-03 PROCEDURE — 99212 OFFICE O/P EST SF 10 MIN: CPT

## 2021-09-03 RX ORDER — LINACLOTIDE 145 UG/1
145 CAPSULE, GELATIN COATED ORAL
Qty: 90 | Refills: 3 | Status: DISCONTINUED | COMMUNITY
Start: 2021-05-25 | End: 2021-09-03

## 2021-09-03 RX ORDER — MECLIZINE HYDROCHLORIDE 25 MG/1
25 TABLET ORAL 3 TIMES DAILY
Qty: 90 | Refills: 0 | Status: DISCONTINUED | COMMUNITY
Start: 2018-09-21 | End: 2021-09-03

## 2021-09-03 RX ORDER — NAPROXEN 500 MG/1
500 TABLET ORAL
Qty: 60 | Refills: 0 | Status: DISCONTINUED | COMMUNITY
Start: 2020-01-09 | End: 2021-09-03

## 2021-09-03 RX ORDER — MELOXICAM 7.5 MG/1
7.5 TABLET ORAL DAILY
Qty: 30 | Refills: 5 | Status: DISCONTINUED | COMMUNITY
Start: 2018-10-16 | End: 2021-09-03

## 2021-09-03 RX ORDER — ALBUTEROL SULFATE 90 UG/1
108 (90 BASE) AEROSOL, METERED RESPIRATORY (INHALATION)
Qty: 1 | Refills: 1 | Status: DISCONTINUED | COMMUNITY
Start: 2018-07-26 | End: 2021-09-03

## 2021-09-03 NOTE — HISTORY OF PRESENT ILLNESS
[FreeTextEntry1] : I saw patient Sandra Finley in follow-up today.  The patient is a 61-year-old female who has a history of anxiety and depression as well as autoimmune disease.  She denies a history of diabetes or coronary artery disease but does smoke and may have occasional shortness of breath.  The patient has a history of chronic constipation and did have small bowel obstruction in the past.  Currently she is on Linzess which she used several times a week which promotes bowel movements at least 3-4 times a week.  The patient denies abdominal distention nausea vomiting fever or chills.  The patient continues to smoke, have a caffeinated beverage daily and does not abuse alcohol.

## 2021-09-03 NOTE — REVIEW OF SYSTEMS
[As Noted in HPI] : as noted in HPI [Constipation] : constipation [Negative] : Cardiovascular [FreeTextEntry6] : Occasional wheezing and mild shortness of breath on exertion due to tobacco use. [FreeTextEntry7] : Chronic constipation as well as a history of small bowel obstruction in the past.

## 2021-09-03 NOTE — ASSESSMENT
[FreeTextEntry1] : The patient is a 61-year-old female with a history of chronic constipation and small bowel obstruction in the past.  Patient is doing quite well with Linzess 290 mcg several days a week to promote bowel motility.  The patient is not demonstrating any adverse reaction such as abdominal cramping or pain.  She will continue to current regimen and maintain adequate hydration and not skip more than 2 to 3 days of a bowel movement.  She is coming up due for repeat colonoscopy and it will be scheduled at her earliest convenience.  Once performed I will distribute a copy of the results and notify the patient.  The patient was told to report back to me any change in her gastrointestinal status.

## 2021-09-03 NOTE — PHYSICAL EXAM
[General Appearance - Alert] : alert [General Appearance - In No Acute Distress] : in no acute distress [General Appearance - Well Developed] : well developed [Respiration, Rhythm And Depth] : normal respiratory rhythm and effort [Auscultation Breath Sounds / Voice Sounds] : lungs were clear to auscultation bilaterally [Apical Impulse] : the apical impulse was normal [Heart Rate And Rhythm] : heart rate was normal and rhythm regular [Heart Sounds] : normal S1 and S2 [Bowel Sounds] : normal bowel sounds [Abdomen Soft] : soft [Abdomen Tenderness] : non-tender [] : no hepato-splenomegaly

## 2021-12-03 ENCOUNTER — APPOINTMENT (OUTPATIENT)
Dept: GASTROENTEROLOGY | Facility: CLINIC | Age: 61
End: 2021-12-03
Payer: COMMERCIAL

## 2021-12-03 VITALS
HEART RATE: 84 BPM | DIASTOLIC BLOOD PRESSURE: 72 MMHG | SYSTOLIC BLOOD PRESSURE: 120 MMHG | HEIGHT: 57 IN | WEIGHT: 137 LBS | OXYGEN SATURATION: 98 % | BODY MASS INDEX: 29.56 KG/M2 | TEMPERATURE: 97.7 F

## 2021-12-03 DIAGNOSIS — M85.80 OTHER SPECIFIED DISORDERS OF BONE DENSITY AND STRUCTURE, UNSPECIFIED SITE: ICD-10-CM

## 2021-12-03 DIAGNOSIS — F17.200 NICOTINE DEPENDENCE, UNSPECIFIED, UNCOMPLICATED: ICD-10-CM

## 2021-12-03 DIAGNOSIS — K58.9 IRRITABLE BOWEL SYNDROME W/OUT DIARRHEA: ICD-10-CM

## 2021-12-03 DIAGNOSIS — Z86.59 PERSONAL HISTORY OF OTHER MENTAL AND BEHAVIORAL DISORDERS: ICD-10-CM

## 2021-12-03 DIAGNOSIS — Z83.79 FAMILY HISTORY OF OTHER DISEASES OF THE DIGESTIVE SYSTEM: ICD-10-CM

## 2021-12-03 DIAGNOSIS — R42 DIZZINESS AND GIDDINESS: ICD-10-CM

## 2021-12-03 DIAGNOSIS — Z86.79 PERSONAL HISTORY OF OTHER DISEASES OF THE CIRCULATORY SYSTEM: ICD-10-CM

## 2021-12-03 DIAGNOSIS — K59.00 CONSTIPATION, UNSPECIFIED: ICD-10-CM

## 2021-12-03 DIAGNOSIS — Z72.89 OTHER PROBLEMS RELATED TO LIFESTYLE: ICD-10-CM

## 2021-12-03 DIAGNOSIS — Z80.0 FAMILY HISTORY OF MALIGNANT NEOPLASM OF DIGESTIVE ORGANS: ICD-10-CM

## 2021-12-03 DIAGNOSIS — K56.609 UNSPECIFIED INTESTINAL OBSTRUCTION, UNSPECIFIED AS TO PARTIAL VERSUS COMPLETE OBSTRUCTION: ICD-10-CM

## 2021-12-03 DIAGNOSIS — Z87.09 PERSONAL HISTORY OF OTHER DISEASES OF THE RESPIRATORY SYSTEM: ICD-10-CM

## 2021-12-03 PROCEDURE — 99212 OFFICE O/P EST SF 10 MIN: CPT

## 2021-12-03 NOTE — ASSESSMENT
[FreeTextEntry1] : The patient is a 61-year-old female with a history of autoimmune disease as well as hypertension.  The patient has a remote history of a small bowel obstruction and has not had any further episodes this year.  She does suffer from chronic idiopathic constipation which may have indirectly contributed to her obstruction.  The patient is currently taking Linzess with good clinical response.  He is not having any abdominal cramping or distention but is aware of the fact of possibility of recurrence of a small bowel obstruction.  The patient was given samples of Linzess to use every other day.  She will get back to me with any change in her gastrointestinal status but otherwise we will schedule an elective colonoscopy sometimes next year as part of surveillance for colorectal cancer.

## 2021-12-03 NOTE — HISTORY OF PRESENT ILLNESS
[FreeTextEntry1] : I saw patient Sandra Delatorre in follow-up today.  The patient is a 61-year-old female with a history of autoimmune disease currently under the care of a rheumatologist.  Patient has a history of hypertension but denies a history of diabetes or coronary artery disease.  Patient's appetite is good with no dysphagia or unexplained weight loss.  The patient continues to smoke and does have a mildly decreased exercise tolerance due to cough and deconditioning.  The patient has 1 to 2 cups of caffeine a day rarely has ethanol.  The patient is chronically constipated and currently takes Linzess on alternate day treatments with good clinical response.  She has no abdominal cramping and has a fairly good evacuation following this regimen.  The patient did have a history of small bowel obstruction a year ago but has had no further occurrences.  The patient is apparently due for repeat colonoscopy next year due to a family history of colon cancer.

## 2022-01-03 ENCOUNTER — TRANSCRIPTION ENCOUNTER (OUTPATIENT)
Age: 62
End: 2022-01-03

## 2022-01-15 NOTE — DISCHARGE NOTE ADULT - DISCHARGE DATE
31y  at 8+2 by LMP presents for repeat b-hcg and TVUS for POUL. Pt initially presented on  with LLQ pain, found to have pregnancy of unknown location at that time at  ED with bHCG of 535. Pt returned 1/10, at which point her b-hcg was 1348 and still no confirmed IUP or EUP.  Patient has been actively trying to conceive. This is a desired pregnancy. Denies vaginal bleeding or        . Covid positive as of . Denies CP, SOB, lightheadedness/dizziness, palpitations, abnormal vaginal d/c, f/c, issues with ambulation, voiding, or having BM.    OB H/x:  SAB    GYN H/x: hx of PCOS (resolved per patient)    MED H/x: asthma (hospitalized as a child, never intubated), HTN, T2DM, depression, anxiety, PTSD    SURG H/x: L ear surgery x2,  right lithotripsy for renal stone    Medications: patient prescribed metformin, lisinopril, lexapro, albuterol prn, seroquel, and hydroxizine. However patient said she is "not good at taking meds" and has not been taking these.  Allergies: nkda  Social: marijuana use qd     PHYSICAL EXAM:   Vital Signs Last 24 Hrs  T(C): 36.7 (15 Nehemias 2022 16:16), Max: 36.7 (15 Nehemias 2022 16:16)  T(F): 98 (15 Nehemias 2022 16:16), Max: 98 (15 Nehemias 2022 16:16)  HR: 90 (15 Nehemias 2022 16:16) (90 - 90)  BP: 130/90 (15 Nehemias 2022 16:16) (130/90 - 130/90)5+6   BP(mean): --  RR: 18 (15 Nehemias 2022 16:16) (18 - 18)  SpO2: 99% (15 Nehemias 2022 16:16) (99% - 99%)    **************************  Constitutional: Alert & Oriented x3, No acute distress  Respiratory: Clear to ausculation bilaterally; no wheezing, rhonchi, or crackles  Cardiovascular: regular rate and rhythm, no murmurs, or gallops  Gastrointestinal: soft, non tender, positive bowel sounds, no rebound or guarding   Pelvic exam: deferred  Extremities: no calf tenderness or swelling    LABS:    Urinalysis Basic - ( 15 Nehemias 2022 16:58 )    Color: Yellow / Appearance: Clear / SG: >=1.030 / pH: x  Gluc: x / Ketone: Trace mg/dL  / Bili: Negative / Urobili: 0.2 E.U./dL   Blood: x / Protein: NEGATIVE mg/dL / Nitrite: NEGATIVE   Leuk Esterase: NEGATIVE / RBC: x / WBC x   Sq Epi: x / Non Sq Epi: x / Bacteria: x      HCG Quantitative, Serum: 7722 mIU/mL (01-15 @ 16:56)      RADIOLOGY & ADDITIONAL STUDIES:    INTERPRETATION:  OBSTETRICAL ULTRASOUND - FIRST TRIMESTER    INDICATION: First trimester pregnancy with pregnancy of unknown location.   Patient here to reassess. LMP: 2021    TECHNIQUE: Transabdominal views of the pelvis were obtained followed by   transvaginal views for better visualization of the endometrial cavity.    PRIOR STUDIES: Most recent pelvic ultrasound dated 1/10/2022 and multiple   prior studies dating back to 2010.    FINDINGS:  By dates, the estimated gestational age is 5 weeks 6 days.  A single intrauterine gestation is visible with an average ultrasound age   of 5 weeks 2 days.  Mean sac diameter is 0.7 cm, corresponding to a gestational age of 5   weeks 2 days.  A yolk sac is visible with internal diameter 0.2 cm, which is normal.    There is a 0.9 x 0.3 x 0.6 cm subchorionic hemorrhage.  The cervix is   closed with a length of 2.7 cm.    The uterus is anteverted. The uterus is 7.3 x 4.8 x 5.3 cm.  No   myometrial abnormalities are seen.    The right ovary is normal in size, measuring 2.6 x 1.6 x 1.5 cm. No right   ovarian massesare seen. The left ovary is normal in size, measuring 3.4   x 2.6 x 3.6 cm. No left ovarian masses are seen. Doppler evaluation   demonstrates flow to both ovaries with no evidence of torsion.    No free fluid in the cul-de-sac.      IMPRESSION:  1.Single intrauterine gestational sac with a yolk sac correlating to an   average gestational age of 5 weeks and 6 days. No fetal pole present at   this time.  2.  Small subchorionic hemorrhage.    --- End of Report ---     31y  at 8+2 by LMP presents for repeat b-hcg and TVUS for POUL. Pt initially presented on  with LLQ pain, found to have pregnancy of unknown location at that time at  ED with bHCG of 535. Pt returned 1/10, at which point her b-hcg was 1348 and still no confirmed IUP or EUP.  She was supposed to return in two days from today, , but reports that she continues to feel the same intermittent sharp episodes of left groin pain over the course of over a week, which was making her anxious given her POUL, so she came in early.  Denies current pain or vaginal bleeding. Denies n/v. Patient has been actively trying to conceive. This is a desired pregnancy. Denies vaginal bleeding. Covid positive as of . Denies CP, SOB, lightheadedness/dizziness, palpitations, abnormal vaginal d/c, f/c, issues with ambulation, voiding, or having BM.    OB H/x:  SAB    GYN H/x: hx of PCOS (resolved per patient)    MED H/x: asthma (hospitalized as a child, never intubated), HTN, T2DM, depression, anxiety, PTSD    SURG H/x: L ear surgery x2,  right lithotripsy for renal stone    Medications: patient prescribed metformin, lisinopril, lexapro, albuterol prn, seroquel, and hydroxizine. However patient said she is "not good at taking meds" and has not been taking these.  Allergies: nkda  Social: marijuana use qd     PHYSICAL EXAM:   Vital Signs Last 24 Hrs  T(C): 36.7 (15 Nehemias 2022 16:16), Max: 36.7 (15 Nehemias 2022 16:16)  T(F): 98 (15 Nehemias 2022 16:16), Max: 98 (15 Nehemias 2022 16:16)  HR: 90 (15 Nehemias 2022 16:16) (90 - 90)  BP: 130/90 (15 Nehemias 2022 16:16) (130/90 - 130/90)5+6   BP(mean): --  RR: 18 (15 Nehemias 2022 16:16) (18 - 18)  SpO2: 99% (15 Nehemias 2022 16:16) (99% - 99%)    **************************  Constitutional: Alert & Oriented x3, No acute distress  Respiratory: Clear to ausculation bilaterally; no wheezing, rhonchi, or crackles  Cardiovascular: regular rate and rhythm, no murmurs, or gallops  Gastrointestinal: soft, non tender, positive bowel sounds, no rebound or guarding   Pelvic exam: deferred  Extremities: no calf tenderness or swelling    LABS:    Urinalysis Basic - ( 15 Nehemias 2022 16:58 )    Color: Yellow / Appearance: Clear / SG: >=1.030 / pH: x  Gluc: x / Ketone: Trace mg/dL  / Bili: Negative / Urobili: 0.2 E.U./dL   Blood: x / Protein: NEGATIVE mg/dL / Nitrite: NEGATIVE   Leuk Esterase: NEGATIVE / RBC: x / WBC x   Sq Epi: x / Non Sq Epi: x / Bacteria: x      HCG Quantitative, Serum: 7722 mIU/mL (01-15 @ 16:56)      RADIOLOGY & ADDITIONAL STUDIES:    INTERPRETATION:  OBSTETRICAL ULTRASOUND - FIRST TRIMESTER    INDICATION: First trimester pregnancy with pregnancy of unknown location.   Patient here to reassess. LMP: 2021    TECHNIQUE: Transabdominal views of the pelvis were obtained followed by   transvaginal views for better visualization of the endometrial cavity.    PRIOR STUDIES: Most recent pelvic ultrasound dated 1/10/2022 and multiple   prior studies dating back to 2010.    FINDINGS:  By dates, the estimated gestational age is 5 weeks 6 days.  A single intrauterine gestation is visible with an average ultrasound age   of 5 weeks 2 days.  Mean sac diameter is 0.7 cm, corresponding to a gestational age of 5   weeks 2 days.  A yolk sac is visible with internal diameter 0.2 cm, which is normal.    There is a 0.9 x 0.3 x 0.6 cm subchorionic hemorrhage.  The cervix is   closed with a length of 2.7 cm.    The uterus is anteverted. The uterus is 7.3 x 4.8 x 5.3 cm.  No   myometrial abnormalities are seen.    The right ovary is normal in size, measuring 2.6 x 1.6 x 1.5 cm. No right   ovarian massesare seen. The left ovary is normal in size, measuring 3.4   x 2.6 x 3.6 cm. No left ovarian masses are seen. Doppler evaluation   demonstrates flow to both ovaries with no evidence of torsion.    No free fluid in the cul-de-sac.      IMPRESSION:  1.Single intrauterine gestational sac with a yolk sac correlating to an   average gestational age of 5 weeks and 6 days. No fetal pole present at   this time.  2.  Small subchorionic hemorrhage.    --- End of Report ---     03-Sep-2017

## 2022-01-27 NOTE — ED ADULT NURSE NOTE - PAIN RATING/NUMBER SCALE (0-10): ACTIVITY
Bar Bucio is a 67 y.o. female  presents for back pain. She feel and injured her back. Allergies   Allergen Reactions    Celebrex [Celecoxib] Swelling    Shellfish Derived Other (comments)      Pt.denies     Sulfa (Sulfonamide Antibiotics) Hives and Swelling     Lips swelling    Gabapentin Diarrhea and Palpitations     Chest and Abdominal pain    Humira [Adalimumab] Other (comments)     Lupus    Influenza Virus Vaccines Hives    Iodine Itching     Takes benadryl and is OK.  Optiray 320 [Ioversol] Hives and Itching     Pt states when she gets iv contrast she itches a little from hives?     Penicillins Hives    Shrimp Other (comments)     Sodium puffy       Patient Active Problem List   Diagnosis Code    DDD (degenerative disc disease), lumbar M51.36    Spondylolisthesis of lumbar region M43.16    Status post lumbar surgery Z98.890    Fibrosis of left subtalar joint M24.672    H/O calcium pyrophosphate deposition disease (CPPD) Z87.39    IBS (irritable bowel syndrome) K58.9    RA (rheumatoid arthritis) ([facility]) M06.9    Sicca syndrome ([facility]) M35.00    Osteoarthritis of right hip M16.11    Pain management contract agreement Z02.89    ACP (advance care planning) Z71.89    Chronic left shoulder pain M25.512, G89.29    Osteopenia M85.80    Osteopenia of multiple sites M85.89    Osteoarthritis of left hip M16.12    Closed fracture of thoracic spine without spinal cord lesion ([facility] [address] S22.009A    Spinal stenosis, thoracolumbar region M48.05    Lumbar spine instability M53.2X6    Spinal stenosis of lumbar region with neurogenic claudication M48.062    Spinal stenosis of lumbar region M48.061    Lumbar stenosis M48.061    Nausea R11.0    Acute bilateral thoracic back pain M54.6    Anxiety F41.9    Chest pain R07.9    Essential hypertension I10    Cataracts, bilateral H26.9    Diverticulitis K57.92    Elevated blood pressure reading without diagnosis of hypertension R03.0    Fall at home [address] [place], Y92.009    Insomnia G47.00    Lesion of hard palate K13.79    Lesion of neck L98.9    Numbness and tingling of right arm R20.0, R20.2    Osteoporosis M81.0    Sciatica M54.30    Sialolithiasis K11.5    Urinary retention with incomplete bladder emptying R33.9    SVT (supraventricular tachycardia) ([facility]) I47.1     Past Medical History:   Diagnosis Date    Abdominal abscess 2009    Adverse effect of anesthesia      Be careful with intubation.  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 )    Irritable bowel syndrome     Neuropathy     bilateral hands/wrist    Osteoporosis     Pneumonia     RA (rheumatoid arthritis) ([address]     Seizures ([facility] [address] 6-1-2008    one episode- after high dose of epinepherine     Social History     Socioeconomic History    Marital status:    Tobacco Use    Smoking status: Never Smoker    Smokeless tobacco: Never Used   Vaping Use    Vaping Use: Never used   Substance and Sexual Activity    Alcohol use: No    Drug use: Never    Sexual activity: Not Currently     Family History   Problem Relation Age of Onset    Other Other         Orthopedic (Sister)    OSTEOARTHRITIS Sister     Heart Attack Brother 58    Cancer Neg Hx     Diabetes Neg Hx     Heart Disease Neg Hx     Hypertension Neg Hx     Stroke Neg Hx     Malignant Hyperthermia Neg Hx     Pseudocholinesterase Deficiency Neg Hx     Delayed Awakening Neg Hx     Post-op Nausea/Vomiting Neg Hx     Emergence Delirium Neg Hx     Post-op Cognitive Dysfunction Neg Hx         Review of Systems   Constitutional: Negative for chills, fever, malaise/fatigue and weight loss. Eyes: Negative for blurred vision. Respiratory: Negative for cough, shortness of breath and wheezing. Cardiovascular: Negative for chest pain. Gastrointestinal: Negative for nausea and vomiting. Musculoskeletal: Positive for back pain. Negative for myalgias. Skin: Negative for rash. Neurological: Negative for dizziness, weakness and headaches. Psychiatric/Behavioral: Negative. Vitals:    01/27/22 1059   BP: 133/76   PainSc:   8       Physical Exam  Vitals and nursing note reviewed. Neck:      Thyroid: No thyromegaly. Cardiovascular:      Rate and Rhythm: Normal rate and regular rhythm. Pulses: Normal pulses. Heart sounds: Normal heart sounds. Pulmonary:      Effort: Pulmonary effort is normal.      Breath sounds: Normal breath sounds. Musculoskeletal:         General: Normal range of motion. Cervical back: Normal range of motion and neck supple. Skin:     General: Skin is warm and dry. Capillary Refill: Capillary refill takes less than 2 seconds. Neurological:      Mental Status: She is alert and oriented to person, place, and time. Psychiatric:         Mood and Affect: Mood normal.         Behavior: Behavior normal.         Thought Content: Thought content normal.         Judgment: Judgment normal.         Assessment/Plan      ICD-10-CM ICD-9-CM    1. Lumbar back pain  M54.50 724.2 cyclobenzaprine (FLEXERIL) 10 mg tablet      REFERRAL TO PHYSICAL THERAPY   2. Rheumatoid arthritis, involving unspecified site, unspecified whether rheumatoid factor present ([facility])  M06.9 714.0    3. SVT (supraventricular tachycardia) ([facility])  I47.1 427.89    4. Skin lesion  L98.9 709.9 REFERRAL TO DERMATOLOGY     I have discussed the diagnosis with the patient and the intended plan of care as seen in the above orders. The patient has received an after-visit summary and questions were answered concerning future plans.  I have discussed medication, side effects, and warnings with the patient in detail. The patient verbalized understanding and is in agreement with the plan of care. The patient will contact the office with any additional concerns.         lab results and schedule of future lab studies reviewed with patient    Oracio Mcnulty MD 4

## 2022-06-01 ENCOUNTER — APPOINTMENT (OUTPATIENT)
Dept: PULMONOLOGY | Facility: CLINIC | Age: 62
End: 2022-06-01
Payer: COMMERCIAL

## 2022-06-01 PROCEDURE — 99213 OFFICE O/P EST LOW 20 MIN: CPT | Mod: 95

## 2022-06-01 RX ORDER — ALBUTEROL SULFATE 90 UG/1
108 (90 BASE) INHALANT RESPIRATORY (INHALATION)
Qty: 1 | Refills: 5 | Status: ACTIVE | COMMUNITY
Start: 2022-06-01 | End: 1900-01-01

## 2022-06-01 NOTE — HISTORY OF PRESENT ILLNESS
[TextBox_4] : Reports increasing respiratory symptoms and increasing cough she had fever for 1 day that resolved she did note some environmental fume exposure.  Use ProAir with relief

## 2022-06-01 NOTE — ASSESSMENT
[FreeTextEntry1] : We will give short course of oral steroids advised to use albuterol inhaler.  Medication sent to pharmacy schedule office follow-up

## 2022-06-01 NOTE — REASON FOR VISIT
[Home] : at home, [unfilled] , at the time of the visit. [Verbal consent obtained from patient] : the patient, [unfilled] [Medical Office: ([facility])___] : at the medical office located in

## 2022-09-15 NOTE — H&P PST ADULT - BREASTS DETAILS
"Spoke primarily with pt's daughter Svitlana. Pt and other daughter Leslie also present on the call.     Svitlana wanted to know if the [facility] request for [facility] form had been [facility] to the [facility]. I let her know I need Dr. Otero's signature which I will get today. Will fax the forms and clinic notes once I have the signature.     Pt's [facility] neurologist is Dr. Ny. Svitlana is requesting that his \"acting\" neurologist, Dr. Plummer, write a letter indicating a connection between pt's \"Parkinson's-like symptoms\", ie tremor, and his exposure to Agent Orange while serving in [place]. Will follow up with Dr. Plummer.    Svitlana is having difficulty accessing [facility] for the surgery. She has not had difficulty before today. Will follow-up with Yusuf to see if there are any known issues.     No further questions at this time.   "
I let Svitlana know that Dr. Plummer cannot provide a note for the [facility] linking pt's symptoms to agent orange because his diagnosis is essential tremor, not Parkinsonism or Parkinson's disease. Essential tremor is a distinctly different diagnosis. She asked why and how pt developed ET. I explained that there can be a genetic component; pt's daughter would like more information about how one develops ET. Will request follow-up by neurology movement disorder team. No further questions at this time.   
Spoke to Svitlana regarding different types of tremors and ET vs Parkinson's disease. She asked about exposures such as agent orange and ET. Advised we do not know of anything like this that would cause ET but she may check with the [facility]. Discuss genetics vs. TBI vs. Unknown causes in detail. Gave her the [facility] and [facility] web sites to read further about essential tremors. Encouraged her to call if she has anymore questions.    13 minute phone call  
normal shape

## 2022-12-08 ENCOUNTER — APPOINTMENT (OUTPATIENT)
Dept: PULMONOLOGY | Facility: CLINIC | Age: 62
End: 2022-12-08

## 2022-12-08 VITALS — DIASTOLIC BLOOD PRESSURE: 81 MMHG | OXYGEN SATURATION: 96 % | SYSTOLIC BLOOD PRESSURE: 125 MMHG | HEART RATE: 85 BPM

## 2022-12-08 DIAGNOSIS — R59.1 GENERALIZED ENLARGED LYMPH NODES: ICD-10-CM

## 2022-12-08 PROCEDURE — G0296 VISIT TO DETERM LDCT ELIG: CPT

## 2022-12-08 RX ORDER — PREDNISONE 20 MG/1
20 TABLET ORAL
Qty: 10 | Refills: 0 | Status: DISCONTINUED | COMMUNITY
Start: 2022-06-01 | End: 2022-12-08

## 2022-12-09 PROBLEM — R59.1 LYMPHADENOPATHY: Status: ACTIVE | Noted: 2018-08-03

## 2022-12-09 NOTE — ASSESSMENT
[FreeTextEntry1] : Criteria for lung cancer screening as active smoker.  Not willing to quit smoking at this time.  Did advise holding off on lung cancer screening CT because of recent COVID should have it in about a month

## 2022-12-09 NOTE — HISTORY OF PRESENT ILLNESS
[TextBox_4] : MILDRED RESENDEZ is a 62 year old female who presents for follow-up evaluation of \par \par Patient states she had Covid on 11/09/2022.\par Patient continues to smoke cigarettes. \par She is due for a chest CT lung cancer screen. \par She denies taking prednisone.  Has gained weight since last visit\par Denies any breathing problems. She complains of occasional cough and sinus congestion. \par Patient is up to date with flu vaccination, Covid booster this season. Denies having a pneumonia vaccine.

## 2022-12-09 NOTE — ADDENDUM
[FreeTextEntry1] : I, Bladimir Cunningham, acted solely as a scribe for Dr. Beck Chavis M.D. on this date 12/08/2022.\par \par All medical record entries made by the Scribe were at my, Dr. Beck Chavis M.D. direction and personally dictated by me on 12/08/2022 . I have reviewed the chart and agree that the record accurately reflects my personal performance of the history, physical exam, assessment and plan. I have also personally directed, reviewed, and agreed with the chart.

## 2022-12-29 ENCOUNTER — APPOINTMENT (OUTPATIENT)
Dept: PULMONOLOGY | Facility: CLINIC | Age: 62
End: 2022-12-29
Payer: COMMERCIAL

## 2022-12-29 VITALS
SYSTOLIC BLOOD PRESSURE: 118 MMHG | WEIGHT: 146 LBS | DIASTOLIC BLOOD PRESSURE: 71 MMHG | BODY MASS INDEX: 31.59 KG/M2 | OXYGEN SATURATION: 94 % | HEART RATE: 83 BPM

## 2022-12-29 DIAGNOSIS — Z01.812 ENCOUNTER FOR PREPROCEDURAL LABORATORY EXAMINATION: ICD-10-CM

## 2022-12-29 LAB — POCT - HEMOGLOBIN (HGB), QUANTITATIVE, TRANSCUTANEOUS: 15

## 2022-12-29 PROCEDURE — 94727 GAS DIL/WSHOT DETER LNG VOL: CPT

## 2022-12-29 PROCEDURE — 88738 HGB QUANT TRANSCUTANEOUS: CPT

## 2022-12-29 PROCEDURE — 94729 DIFFUSING CAPACITY: CPT

## 2022-12-29 PROCEDURE — 94010 BREATHING CAPACITY TEST: CPT

## 2023-01-11 ENCOUNTER — TRANSCRIPTION ENCOUNTER (OUTPATIENT)
Age: 63
End: 2023-01-11

## 2023-01-12 ENCOUNTER — TRANSCRIPTION ENCOUNTER (OUTPATIENT)
Age: 63
End: 2023-01-12

## 2023-01-12 ENCOUNTER — NON-APPOINTMENT (OUTPATIENT)
Age: 63
End: 2023-01-12

## 2023-01-12 VITALS — HEIGHT: 58 IN | WEIGHT: 143 LBS | BODY MASS INDEX: 30.02 KG/M2

## 2023-01-12 DIAGNOSIS — F17.210 NICOTINE DEPENDENCE, CIGARETTES, UNCOMPLICATED: ICD-10-CM

## 2023-01-12 NOTE — HISTORY OF PRESENT ILLNESS
[Current] : Current [TextBox_13] : Referred by Dr. Chavis\par \par Ms. MILDRED RESENDEZ is a 62 year old woman with a history of nicotine dependence\par \par  Over the telephone today we reviewed and confirmed that the patient meets screening eligibility criteria:\par \par -Age: 62 years old\par \par Smoking status:\par \par -Current smoker\par \par -Number of pack(s) per day: 1.5\par \par -Number of years smoked: 40\par \par -Number of pack years smokin\par \par \par Ms. RESENDEZ denies any personal history of lung cancer. Denies any s/s of lung cancer. No lung cancer in a 1st degree relative. H/o Asthma.  Denies any history of occupational exposures\par  [PacksperYear] : 60

## 2023-01-12 NOTE — REASON FOR VISIT
[Other Location: e.g. School (Enter Location, City,State)___] : at [unfilled], at the time of the visit. [Medical Office: ([facility])___] : at the medical office located in  [Verbal consent obtained from patient] : the patient, [unfilled] [Initial Evaluation] : an initial evaluation visit [Review of Eligibility] : review of eligibility [Low-Dose CT Screening Discussion] : low-dose CT lung cancer screening discussion [Virtual Visit] : virtual visit

## 2023-01-13 ENCOUNTER — RESULT REVIEW (OUTPATIENT)
Age: 63
End: 2023-01-13

## 2023-01-13 ENCOUNTER — APPOINTMENT (OUTPATIENT)
Dept: CT IMAGING | Facility: IMAGING CENTER | Age: 63
End: 2023-01-13
Payer: COMMERCIAL

## 2023-01-13 ENCOUNTER — OUTPATIENT (OUTPATIENT)
Dept: OUTPATIENT SERVICES | Facility: HOSPITAL | Age: 63
LOS: 1 days | End: 2023-01-13
Payer: COMMERCIAL

## 2023-01-13 ENCOUNTER — TRANSCRIPTION ENCOUNTER (OUTPATIENT)
Age: 63
End: 2023-01-13

## 2023-01-13 DIAGNOSIS — Z00.8 ENCOUNTER FOR OTHER GENERAL EXAMINATION: ICD-10-CM

## 2023-01-13 PROCEDURE — 71271 CT THORAX LUNG CANCER SCR C-: CPT | Mod: 26

## 2023-01-13 PROCEDURE — 71271 CT THORAX LUNG CANCER SCR C-: CPT

## 2023-01-15 ENCOUNTER — NON-APPOINTMENT (OUTPATIENT)
Age: 63
End: 2023-01-15

## 2023-01-16 ENCOUNTER — TRANSCRIPTION ENCOUNTER (OUTPATIENT)
Age: 63
End: 2023-01-16

## 2023-01-19 ENCOUNTER — TRANSCRIPTION ENCOUNTER (OUTPATIENT)
Age: 63
End: 2023-01-19

## 2023-01-22 ENCOUNTER — TRANSCRIPTION ENCOUNTER (OUTPATIENT)
Age: 63
End: 2023-01-22

## 2023-05-09 ENCOUNTER — APPOINTMENT (OUTPATIENT)
Dept: PULMONOLOGY | Facility: CLINIC | Age: 63
End: 2023-05-09
Payer: COMMERCIAL

## 2023-05-09 VITALS
SYSTOLIC BLOOD PRESSURE: 126 MMHG | OXYGEN SATURATION: 95 % | HEIGHT: 58 IN | BODY MASS INDEX: 30.86 KG/M2 | WEIGHT: 147 LBS | HEART RATE: 92 BPM | DIASTOLIC BLOOD PRESSURE: 76 MMHG

## 2023-05-09 DIAGNOSIS — R05.9 COUGH, UNSPECIFIED: ICD-10-CM

## 2023-05-09 DIAGNOSIS — J06.9 ACUTE UPPER RESPIRATORY INFECTION, UNSPECIFIED: ICD-10-CM

## 2023-05-09 PROCEDURE — 99213 OFFICE O/P EST LOW 20 MIN: CPT

## 2023-05-09 RX ORDER — FLUTICASONE PROPIONATE AND SALMETEROL XINAFOATE 115; 21 UG/1; UG/1
115-21 AEROSOL, METERED RESPIRATORY (INHALATION)
Qty: 1 | Refills: 5 | Status: COMPLETED | COMMUNITY
Start: 2022-12-29 | End: 2023-05-09

## 2023-05-09 RX ORDER — LINACLOTIDE 145 UG/1
145 CAPSULE, GELATIN COATED ORAL
Qty: 90 | Refills: 1 | Status: COMPLETED | COMMUNITY
End: 2023-05-09

## 2023-05-09 RX ORDER — LUBIPROSTONE 24 UG/1
24 CAPSULE ORAL
Qty: 60 | Refills: 1 | Status: COMPLETED | COMMUNITY
Start: 2022-04-25 | End: 2023-05-09

## 2023-05-09 RX ORDER — ACETAMINOPHEN 500 MG/1
500 TABLET, COATED ORAL
Qty: 1 | Refills: 3 | Status: COMPLETED | COMMUNITY
Start: 2020-04-13 | End: 2023-05-09

## 2023-05-09 RX ORDER — METHYLPREDNISOLONE 4 MG/1
4 TABLET ORAL
Qty: 1 | Refills: 0 | Status: ACTIVE | COMMUNITY
Start: 2023-05-09 | End: 1900-01-01

## 2023-05-09 NOTE — ASSESSMENT
[FreeTextEntry1] : Acute URI symptoms in patient with underlying autoimmune disease and chronic airways disease.\par \par Start Medrol pack and Zpack \par \par RVP results spending

## 2023-05-09 NOTE — REVIEW OF SYSTEMS
[Sore Throat] : sore throat [Nasal Congestion] : nasal congestion [Cough] : cough [Chest Tightness] : chest tightness [Negative] : Endocrine [TextBox_14] : hoarseness

## 2023-05-09 NOTE — REASON FOR VISIT
[Follow-Up] : a follow-up visit [Cough] : cough [Shortness of Breath] : shortness of breath [TextBox_44] : sore throat

## 2023-05-09 NOTE — PHYSICAL EXAM
[No Acute Distress] : no acute distress [Normal Appearance] : normal appearance [No Neck Mass] : no neck mass [Normal Rate/Rhythm] : normal rate/rhythm [Normal S1, S2] : normal s1, s2 [No Murmurs] : no murmurs [No Resp Distress] : no resp distress [Clear to Auscultation Bilaterally] : clear to auscultation bilaterally [No Abnormalities] : no abnormalities [Benign] : benign [Normal Gait] : normal gait [No Clubbing] : no clubbing [No Cyanosis] : no cyanosis [No Edema] : no edema [FROM] : FROM [Normal Color/ Pigmentation] : normal color/ pigmentation [No Focal Deficits] : no focal deficits [Oriented x3] : oriented x3 [Normal Affect] : normal affect [TextBox_11] : Mild pharyngeal erythema no exudates

## 2023-05-10 LAB
RAPID RVP RESULT: NOT DETECTED
SARS-COV-2 RNA PNL RESP NAA+PROBE: NOT DETECTED

## 2023-08-22 NOTE — HISTORY OF PRESENT ILLNESS
[Former] : former [TextBox_4] : Acute visit shortness of breath cough.\par \par Experiencing coughing at night with occasional chest tightness, mild SOB, nasal congestion, sore throat x 1 week\par \par Began taking Mucinex, which has helped mildly; used albuterol yesterday and this morning (@10am); not taking Advair due to coverage  [YearQuit] : 2023 [TextBox_29] : S Use Therapeutic Ranged Or Therapeutic Target: please select Range or Target

## 2024-01-01 RX ORDER — LINACLOTIDE 290 UG/1
290 CAPSULE, GELATIN COATED ORAL
Qty: 30 | Refills: 1 | Status: ACTIVE | COMMUNITY
Start: 2022-12-19 | End: 1900-01-01

## 2024-01-22 ENCOUNTER — TRANSCRIPTION ENCOUNTER (OUTPATIENT)
Age: 64
End: 2024-01-22

## 2024-03-18 ENCOUNTER — APPOINTMENT (OUTPATIENT)
Dept: PULMONOLOGY | Facility: CLINIC | Age: 64
End: 2024-03-18
Payer: COMMERCIAL

## 2024-03-18 VITALS — HEART RATE: 76 BPM | DIASTOLIC BLOOD PRESSURE: 76 MMHG | SYSTOLIC BLOOD PRESSURE: 122 MMHG | OXYGEN SATURATION: 95 %

## 2024-03-18 DIAGNOSIS — M35.9 SYSTEMIC INVOLVEMENT OF CONNECTIVE TISSUE, UNSPECIFIED: ICD-10-CM

## 2024-03-18 DIAGNOSIS — Z12.2 ENCOUNTER FOR SCREENING FOR MALIGNANT NEOPLASM OF RESPIRATORY ORGANS: ICD-10-CM

## 2024-03-18 DIAGNOSIS — R94.2 ABNORMAL RESULTS OF PULMONARY FUNCTION STUDIES: ICD-10-CM

## 2024-03-18 LAB — POCT - HEMOGLOBIN (HGB), QUANTITATIVE, TRANSCUTANEOUS: 13.9

## 2024-03-18 PROCEDURE — 99213 OFFICE O/P EST LOW 20 MIN: CPT | Mod: 25

## 2024-03-18 PROCEDURE — ZZZZZ: CPT

## 2024-03-18 PROCEDURE — G0296 VISIT TO DETERM LDCT ELIG: CPT

## 2024-03-18 PROCEDURE — 94727 GAS DIL/WSHOT DETER LNG VOL: CPT

## 2024-03-18 PROCEDURE — 88738 HGB QUANT TRANSCUTANEOUS: CPT

## 2024-03-18 PROCEDURE — 94729 DIFFUSING CAPACITY: CPT

## 2024-03-18 PROCEDURE — 94060 EVALUATION OF WHEEZING: CPT

## 2024-03-18 RX ORDER — AZITHROMYCIN 250 MG/1
250 TABLET, FILM COATED ORAL
Qty: 1 | Refills: 0 | Status: DISCONTINUED | COMMUNITY
Start: 2023-05-09 | End: 2024-03-18

## 2024-03-18 RX ORDER — FLUTICASONE PROPIONATE AND SALMETEROL 113; 14 UG/1; UG/1
113-14 POWDER, METERED RESPIRATORY (INHALATION)
Qty: 1 | Refills: 3 | Status: DISCONTINUED | COMMUNITY
Start: 2023-01-13 | End: 2024-03-18

## 2024-03-18 NOTE — ASSESSMENT
[FreeTextEntry1] : Reports shortness of breath and PFT consistent with COPD.. Started patient on a trial of Trelegy 100 mcg 1 puff once a day.  Due for follow-up lung cancer screening CT  Reviewed recent lab work which was unremarkable.  Follow up in 3 months.

## 2024-03-18 NOTE — PHYSICAL EXAM
[No Acute Distress] : no acute distress [Normal Oropharynx] : normal oropharynx [Normal Appearance] : normal appearance [No Neck Mass] : no neck mass [Normal Rate/Rhythm] : normal rate/rhythm [No Murmurs] : no murmurs [Normal S1, S2] : normal s1, s2 [No Resp Distress] : no resp distress [No Abnormalities] : no abnormalities [Clear to Auscultation Bilaterally] : clear to auscultation bilaterally [Benign] : benign [Normal Gait] : normal gait [No Clubbing] : no clubbing [No Cyanosis] : no cyanosis [FROM] : FROM [No Edema] : no edema [Normal Color/ Pigmentation] : normal color/ pigmentation [Oriented x3] : oriented x3 [No Focal Deficits] : no focal deficits [Normal Affect] : normal affect

## 2024-03-18 NOTE — HISTORY OF PRESENT ILLNESS
[Former] : former [Never] : never [TextBox_4] : MILDRED RESENDEZ is a 63 year old female, with history of COPD and osteoarthritis, who presents to the office for follow up evaluation. Patient reports that she is feeling well overall. She states of having intermittent symptoms of dyspnea on exertion. Patient also reports of having symptoms of leg and knee pain and intermittent lumbar pain.  She quit smoking over the past year.  She gained a lot of weight and she thinks for this reason she has having more arthritis. [YearQuit] : 2023

## 2024-03-18 NOTE — ADDENDUM
[FreeTextEntry1] : I, Davidjackeline Carmita, acted solely as a scribe for Dr. Beck Chavis M.D. on this date 03/18/2024.   All medical record entries made by the Scribe were at my, Dr. Beck Chavis M.D., direction and personally dictated by me on 03/18/2024. I have reviewed the chart and agree that the record accurately reflects my personal performance of the history, physical exam, assessment and plan. I have also personally directed, reviewed, and agreed with the chart.

## 2024-03-18 NOTE — PROCEDURE
[FreeTextEntry1] : PFTs demonstrate obstructive pattern with slight interval improvement in spirometry and diffusing capacity  Reviewed labs from rheumatologist essentially unremarkable

## 2024-03-19 ENCOUNTER — NON-APPOINTMENT (OUTPATIENT)
Age: 64
End: 2024-03-19

## 2024-03-19 VITALS — HEIGHT: 58 IN | WEIGHT: 147 LBS | BODY MASS INDEX: 30.86 KG/M2

## 2024-03-19 DIAGNOSIS — Z87.891 PERSONAL HISTORY OF NICOTINE DEPENDENCE: ICD-10-CM

## 2024-03-19 NOTE — HISTORY OF PRESENT ILLNESS
[Current] : Current [TextBox_13] : Referred by Dr. Chavis  Ms. MILDRED RESENDEZ is a 63 year old woman with a history of nicotine dependence  Reviewed and confirmed that the patient meets screening eligibility criteria:  Smoking status:  -Former smoker  -Number of pack(s) per day: 1.5  -Number of years smoked: 40  -Number of pack years smokin Quit year:    Ms. RESENDEZ denies any personal history of lung cancer. Denies any s/s of lung cancer. No lung cancer in a 1st degree relative. H/o Asthma.  Denies any history of occupational exposures  [PacksperYear] : 60

## 2024-03-20 ENCOUNTER — TRANSCRIPTION ENCOUNTER (OUTPATIENT)
Age: 64
End: 2024-03-20

## 2024-03-29 ENCOUNTER — APPOINTMENT (OUTPATIENT)
Dept: CT IMAGING | Facility: IMAGING CENTER | Age: 64
End: 2024-03-29
Payer: COMMERCIAL

## 2024-03-29 ENCOUNTER — OUTPATIENT (OUTPATIENT)
Dept: OUTPATIENT SERVICES | Facility: HOSPITAL | Age: 64
LOS: 1 days | End: 2024-03-29
Payer: COMMERCIAL

## 2024-03-29 DIAGNOSIS — Z98.89 OTHER SPECIFIED POSTPROCEDURAL STATES: Chronic | ICD-10-CM

## 2024-03-29 DIAGNOSIS — M35.9 SYSTEMIC INVOLVEMENT OF CONNECTIVE TISSUE, UNSPECIFIED: ICD-10-CM

## 2024-03-29 DIAGNOSIS — Z87.39 PERSONAL HISTORY OF OTHER DISEASES OF THE MUSCULOSKELETAL SYSTEM AND CONNECTIVE TISSUE: Chronic | ICD-10-CM

## 2024-03-29 PROCEDURE — 71271 CT THORAX LUNG CANCER SCR C-: CPT | Mod: 26

## 2024-03-29 PROCEDURE — 71271 CT THORAX LUNG CANCER SCR C-: CPT

## 2024-04-09 ENCOUNTER — NON-APPOINTMENT (OUTPATIENT)
Age: 64
End: 2024-04-09

## 2024-06-17 ENCOUNTER — APPOINTMENT (OUTPATIENT)
Dept: PULMONOLOGY | Facility: CLINIC | Age: 64
End: 2024-06-17
Payer: MEDICARE

## 2024-06-17 VITALS — DIASTOLIC BLOOD PRESSURE: 74 MMHG | SYSTOLIC BLOOD PRESSURE: 125 MMHG | HEART RATE: 75 BPM | OXYGEN SATURATION: 97 %

## 2024-06-17 DIAGNOSIS — J44.9 CHRONIC OBSTRUCTIVE PULMONARY DISEASE, UNSPECIFIED: ICD-10-CM

## 2024-06-17 PROCEDURE — ZZZZZ: CPT

## 2024-06-17 PROCEDURE — 94010 BREATHING CAPACITY TEST: CPT

## 2024-06-17 PROCEDURE — 94727 GAS DIL/WSHOT DETER LNG VOL: CPT

## 2024-06-17 PROCEDURE — 94729 DIFFUSING CAPACITY: CPT

## 2024-06-17 PROCEDURE — 99203 OFFICE O/P NEW LOW 30 MIN: CPT | Mod: 25

## 2024-06-17 RX ORDER — FLUTICASONE FUROATE, UMECLIDINIUM BROMIDE AND VILANTEROL TRIFENATATE 100; 62.5; 25 UG/1; UG/1; UG/1
100-62.5-25 POWDER RESPIRATORY (INHALATION) DAILY
Qty: 3 | Refills: 3 | Status: ACTIVE | COMMUNITY
Start: 2024-03-18 | End: 1900-01-01

## 2024-06-18 NOTE — ASSESSMENT
[FreeTextEntry1] : Ms. MILDRED RESENDEZ is an 64 year old female, history of COPD. Improved lung function indicated in PFT today. Continue Trelegy 100 mcg 1 puff daily for COPD treatment. Encouraged patient to maintain smoking cessation. Reviewed recent lab work which was unremarkable.  Follow up in 6 months.

## 2024-06-18 NOTE — HISTORY OF PRESENT ILLNESS
[Former] : former [Never] : never [TextBox_4] : Prior: Patient reports that she is feeling well overall. She states of having intermittent symptoms of dyspnea on exertion. Patient also reports of having symptoms of leg and knee pain and intermittent lumbar pain.  She quit smoking over the past year.  She gained a lot of weight and she thinks for this reason she has having more arthritis.  Current: MILDRED RESENDEZ is a 63 year old female, with history of COPD and osteoarthritis, who presents to the office for follow up evaluation. Patient reports of having symptoms of dyspnea which has been worsening due to the weather. She also states of having symptoms of nasal congestion. Patient reports that she continues to gain weight at this time. She states that she continues to take Trelegy on a daily basis for COPD treatment. [YearQuit] : 2023

## 2024-06-18 NOTE — ADDENDUM
[FreeTextEntry1] : I, Yandy Monlevi, acted solely as a scribe for Dr. Beck Chavis M.D. on this date 06/17/2024.   All medical record entries made by the Scribe were at my, Dr. Beck Chavis M.D., direction and personally dictated by me on 06/17/2024. I have reviewed the chart and agree that the record accurately reflects my personal performance of the history, physical exam, assessment and plan. I have also personally directed, reviewed, and agreed with the chart.

## 2024-07-11 ENCOUNTER — APPOINTMENT (OUTPATIENT)
Dept: PULMONOLOGY | Facility: CLINIC | Age: 64
End: 2024-07-11
Payer: MEDICARE

## 2024-07-11 DIAGNOSIS — U07.1 COVID-19: ICD-10-CM

## 2024-07-11 PROCEDURE — 99442: CPT | Mod: 93

## 2024-07-11 RX ORDER — NIRMATRELVIR AND RITONAVIR 300-100 MG
20 X 150 MG & KIT ORAL
Qty: 1 | Refills: 0 | Status: ACTIVE | COMMUNITY
Start: 2024-07-11 | End: 1900-01-01

## 2024-09-26 ENCOUNTER — NON-APPOINTMENT (OUTPATIENT)
Age: 64
End: 2024-09-26

## 2024-09-26 ENCOUNTER — APPOINTMENT (OUTPATIENT)
Dept: PULMONOLOGY | Facility: CLINIC | Age: 64
End: 2024-09-26
Payer: MEDICARE

## 2024-09-26 VITALS
SYSTOLIC BLOOD PRESSURE: 119 MMHG | OXYGEN SATURATION: 97 % | HEART RATE: 85 BPM | RESPIRATION RATE: 16 BRPM | DIASTOLIC BLOOD PRESSURE: 76 MMHG

## 2024-09-26 DIAGNOSIS — Z01.818 ENCOUNTER FOR OTHER PREPROCEDURAL EXAMINATION: ICD-10-CM

## 2024-09-26 DIAGNOSIS — I73.9 PERIPHERAL VASCULAR DISEASE, UNSPECIFIED: ICD-10-CM

## 2024-09-26 DIAGNOSIS — Z23 ENCOUNTER FOR IMMUNIZATION: ICD-10-CM

## 2024-09-26 DIAGNOSIS — J44.9 CHRONIC OBSTRUCTIVE PULMONARY DISEASE, UNSPECIFIED: ICD-10-CM

## 2024-09-26 PROCEDURE — G0009: CPT

## 2024-09-26 PROCEDURE — 90662 IIV NO PRSV INCREASED AG IM: CPT

## 2024-09-26 PROCEDURE — G0008: CPT

## 2024-09-26 PROCEDURE — 99213 OFFICE O/P EST LOW 20 MIN: CPT

## 2024-09-26 PROCEDURE — 90677 PCV20 VACCINE IM: CPT

## 2024-09-26 PROCEDURE — 36415 COLL VENOUS BLD VENIPUNCTURE: CPT

## 2024-09-26 NOTE — HISTORY OF PRESENT ILLNESS
[Former] : former [Never] : never [TextBox_4] : Prior: Patient reports of having symptoms of dyspnea which has been worsening due to the weather. She also states of having symptoms of nasal congestion. Patient reports that she continues to gain weight at this time. She states that she continues to take Trelegy on a daily basis for COPD treatment.  Current: MILDRED RESENDEZ is a 63 year old female, with history of COPD and osteoarthritis, who presents to the office for medical clearance evaluation for cataract surgery. Patient is feeling well overall with no respiratory complaints. She is a former smoker. She is experiencing difficulties walking, noted ankle edema.      [YearQuit] : 2023

## 2024-09-26 NOTE — ASSESSMENT
[FreeTextEntry1] : Venipuncture for labs drawn in office today Discussed plan for seasonal vaccinations Received influenza vaccination in office today  No medical contraindication to upcoming cataract surgery Referred to vascular surgery for evaluation for peripheral arterial disease

## 2024-09-26 NOTE — ADDENDUM
[FreeTextEntry1] :  I, Luiz Ruizmegin, acted solely as a scribe for Dr. Beck Chavis M.D. on this date 09/26/2024.    All medical record entries made by the Scribe were at my, Dr. Beck Chavis M.D., direction and personally dictated by me on 09/26/2024. I have reviewed the chart and agree that the record accurately reflects my personal performance of the history, physical exam, assessment and plan. I have also personally directed, reviewed, and agreed with the chart.

## 2024-09-29 LAB
ALBUMIN SERPL ELPH-MCNC: 4.6 G/DL
ALP BLD-CCNC: 60 U/L
ALT SERPL-CCNC: 15 U/L
ANION GAP SERPL CALC-SCNC: 13 MMOL/L
APPEARANCE: CLEAR
AST SERPL-CCNC: 18 U/L
BASOPHILS # BLD AUTO: 0.06 K/UL
BASOPHILS NFR BLD AUTO: 0.8 %
BILIRUB SERPL-MCNC: 0.3 MG/DL
BILIRUBIN URINE: NEGATIVE
BLOOD URINE: NEGATIVE
BUN SERPL-MCNC: 22 MG/DL
CALCIUM SERPL-MCNC: 10.4 MG/DL
CHLORIDE SERPL-SCNC: 99 MMOL/L
CHOLEST SERPL-MCNC: 211 MG/DL
CO2 SERPL-SCNC: 25 MMOL/L
COLOR: YELLOW
CREAT SERPL-MCNC: 0.62 MG/DL
EGFR: 99 ML/MIN/1.73M2
EOSINOPHIL # BLD AUTO: 0.03 K/UL
EOSINOPHIL NFR BLD AUTO: 0.4 %
ESTIMATED AVERAGE GLUCOSE: 111 MG/DL
GLUCOSE QUALITATIVE U: NEGATIVE MG/DL
GLUCOSE SERPL-MCNC: 92 MG/DL
HBA1C MFR BLD HPLC: 5.5 %
HCT VFR BLD CALC: 39.3 %
HDLC SERPL-MCNC: 75 MG/DL
HGB BLD-MCNC: 12.8 G/DL
IMM GRANULOCYTES NFR BLD AUTO: 0.4 %
KETONES URINE: NEGATIVE MG/DL
LDLC SERPL CALC-MCNC: 121 MG/DL
LEUKOCYTE ESTERASE URINE: NEGATIVE
LYMPHOCYTES # BLD AUTO: 2.03 K/UL
LYMPHOCYTES NFR BLD AUTO: 26.5 %
MAN DIFF?: NORMAL
MCHC RBC-ENTMCNC: 30.5 PG
MCHC RBC-ENTMCNC: 32.6 GM/DL
MCV RBC AUTO: 93.8 FL
MONOCYTES # BLD AUTO: 0.52 K/UL
MONOCYTES NFR BLD AUTO: 6.8 %
NEUTROPHILS # BLD AUTO: 4.99 K/UL
NEUTROPHILS NFR BLD AUTO: 65.1 %
NITRITE URINE: NEGATIVE
NONHDLC SERPL-MCNC: 136 MG/DL
PH URINE: 7
PLATELET # BLD AUTO: 245 K/UL
POTASSIUM SERPL-SCNC: 4.3 MMOL/L
PROT SERPL-MCNC: 7.5 G/DL
PROTEIN URINE: NEGATIVE MG/DL
RBC # BLD: 4.19 M/UL
RBC # FLD: 13.7 %
SODIUM SERPL-SCNC: 137 MMOL/L
SPECIFIC GRAVITY URINE: 1.01
TRIGL SERPL-MCNC: 90 MG/DL
TSH SERPL-ACNC: 1.95 UIU/ML
UROBILINOGEN URINE: 0.2 MG/DL
WBC # FLD AUTO: 7.66 K/UL

## 2024-12-17 NOTE — ED PROVIDER NOTE - CROS ED HEME ALL NEG
DIAGNOSIS:  Stage 3 chronic kidney disease, unspecified whether stage 3a or 3b CKD (H)    DATE RECEIVED:  01.30.2025   NOTES STATUS DETAILS   OFFICE NOTE from referring provider Internal 08.15.2024  Marry Rodriguez MD    MEDICATION LIST Internal    LABS     CBC Internal 07.02.2024   CMP Internal 07.02.2024   BMP Internal 08.14.2024   UA Internal 11.23.2020   URINE PROTEIN Internal 11.23.2020        - - -

## 2024-12-25 PROBLEM — F10.90 ALCOHOL USE: Status: ACTIVE | Noted: 2021-01-29

## 2025-02-18 ENCOUNTER — NON-APPOINTMENT (OUTPATIENT)
Age: 65
End: 2025-02-18

## 2025-02-18 ENCOUNTER — APPOINTMENT (OUTPATIENT)
Dept: PULMONOLOGY | Facility: CLINIC | Age: 65
End: 2025-02-18
Payer: MEDICARE

## 2025-02-18 VITALS
TEMPERATURE: 98.2 F | DIASTOLIC BLOOD PRESSURE: 62 MMHG | OXYGEN SATURATION: 97 % | WEIGHT: 166 LBS | HEART RATE: 85 BPM | SYSTOLIC BLOOD PRESSURE: 97 MMHG | BODY MASS INDEX: 34.69 KG/M2

## 2025-02-18 DIAGNOSIS — R29.818 OTHER SYMPTOMS AND SIGNS INVOLVING THE NERVOUS SYSTEM: ICD-10-CM

## 2025-02-18 DIAGNOSIS — J44.9 CHRONIC OBSTRUCTIVE PULMONARY DISEASE, UNSPECIFIED: ICD-10-CM

## 2025-02-18 LAB — POCT - HEMOGLOBIN (HGB), QUANTITATIVE, TRANSCUTANEOUS: 11.8

## 2025-02-18 PROCEDURE — 88738 HGB QUANT TRANSCUTANEOUS: CPT

## 2025-02-18 PROCEDURE — 99213 OFFICE O/P EST LOW 20 MIN: CPT | Mod: 25

## 2025-02-18 PROCEDURE — 94729 DIFFUSING CAPACITY: CPT

## 2025-02-18 PROCEDURE — 94727 GAS DIL/WSHOT DETER LNG VOL: CPT

## 2025-02-18 PROCEDURE — ZZZZZ: CPT

## 2025-02-18 PROCEDURE — 94010 BREATHING CAPACITY TEST: CPT

## 2025-02-18 RX ORDER — LINACLOTIDE 72 UG/1
CAPSULE, GELATIN COATED ORAL
Refills: 0 | Status: ACTIVE | COMMUNITY

## 2025-02-18 RX ORDER — CARBOXYMETHYLCELLULOSE SODIUM 10 MG/ML
1 GEL OPHTHALMIC
Refills: 0 | Status: ACTIVE | COMMUNITY

## 2025-02-18 RX ORDER — SODIUM CHLORIDE 20 MG/ML
2 SOLUTION OPHTHALMIC
Refills: 0 | Status: ACTIVE | COMMUNITY

## 2025-03-07 ENCOUNTER — APPOINTMENT (OUTPATIENT)
Dept: PULMONOLOGY | Facility: CLINIC | Age: 65
End: 2025-03-07
Payer: MEDICARE

## 2025-03-07 PROCEDURE — ZZZZZ: CPT

## 2025-03-18 ENCOUNTER — APPOINTMENT (OUTPATIENT)
Dept: PULMONOLOGY | Facility: CLINIC | Age: 65
End: 2025-03-18
Payer: MEDICARE

## 2025-03-18 VITALS
BODY MASS INDEX: 35.26 KG/M2 | SYSTOLIC BLOOD PRESSURE: 131 MMHG | OXYGEN SATURATION: 99 % | HEART RATE: 85 BPM | HEIGHT: 58 IN | DIASTOLIC BLOOD PRESSURE: 75 MMHG | RESPIRATION RATE: 16 BRPM | WEIGHT: 168 LBS

## 2025-03-18 DIAGNOSIS — G47.33 OBSTRUCTIVE SLEEP APNEA (ADULT) (PEDIATRIC): ICD-10-CM

## 2025-03-18 DIAGNOSIS — J44.9 CHRONIC OBSTRUCTIVE PULMONARY DISEASE, UNSPECIFIED: ICD-10-CM

## 2025-03-18 PROCEDURE — G2211 COMPLEX E/M VISIT ADD ON: CPT

## 2025-03-18 PROCEDURE — 99213 OFFICE O/P EST LOW 20 MIN: CPT

## 2025-03-24 ENCOUNTER — APPOINTMENT (OUTPATIENT)
Dept: PULMONOLOGY | Facility: CLINIC | Age: 65
End: 2025-03-24

## 2025-05-01 ENCOUNTER — TRANSCRIPTION ENCOUNTER (OUTPATIENT)
Age: 65
End: 2025-05-01

## 2025-05-05 ENCOUNTER — TRANSCRIPTION ENCOUNTER (OUTPATIENT)
Age: 65
End: 2025-05-05

## 2025-06-17 ENCOUNTER — APPOINTMENT (OUTPATIENT)
Dept: PULMONOLOGY | Facility: CLINIC | Age: 65
End: 2025-06-17

## 2025-06-28 ENCOUNTER — NON-APPOINTMENT (OUTPATIENT)
Age: 65
End: 2025-06-28

## 2025-06-28 VITALS — BODY MASS INDEX: 35.26 KG/M2 | HEIGHT: 58 IN | WEIGHT: 168 LBS

## 2025-06-30 ENCOUNTER — OUTPATIENT (OUTPATIENT)
Dept: OUTPATIENT SERVICES | Facility: HOSPITAL | Age: 65
LOS: 1 days | End: 2025-06-30
Payer: MEDICARE

## 2025-06-30 ENCOUNTER — APPOINTMENT (OUTPATIENT)
Dept: CT IMAGING | Facility: IMAGING CENTER | Age: 65
End: 2025-06-30
Payer: MEDICARE

## 2025-06-30 DIAGNOSIS — F17.210 NICOTINE DEPENDENCE, CIGARETTES, UNCOMPLICATED: ICD-10-CM

## 2025-06-30 DIAGNOSIS — Z98.89 OTHER SPECIFIED POSTPROCEDURAL STATES: Chronic | ICD-10-CM

## 2025-06-30 DIAGNOSIS — Z87.39 PERSONAL HISTORY OF OTHER DISEASES OF THE MUSCULOSKELETAL SYSTEM AND CONNECTIVE TISSUE: Chronic | ICD-10-CM

## 2025-06-30 PROCEDURE — 71271 CT THORAX LUNG CANCER SCR C-: CPT

## 2025-06-30 PROCEDURE — 71271 CT THORAX LUNG CANCER SCR C-: CPT | Mod: 26

## 2025-07-08 ENCOUNTER — NON-APPOINTMENT (OUTPATIENT)
Age: 65
End: 2025-07-08

## 2025-07-09 ENCOUNTER — TRANSCRIPTION ENCOUNTER (OUTPATIENT)
Age: 65
End: 2025-07-09

## 2025-07-23 ENCOUNTER — APPOINTMENT (OUTPATIENT)
Dept: PULMONOLOGY | Facility: CLINIC | Age: 65
End: 2025-07-23
Payer: MEDICARE

## 2025-07-23 DIAGNOSIS — U07.1 COVID-19: ICD-10-CM

## 2025-07-23 PROCEDURE — 99212 OFFICE O/P EST SF 10 MIN: CPT | Mod: 2W

## 2025-07-23 RX ORDER — NIRMATRELVIR AND RITONAVIR 300-100 MG
20 X 150 MG & KIT ORAL
Qty: 1 | Refills: 0 | Status: ACTIVE | COMMUNITY
Start: 2025-07-23 | End: 1900-01-01

## 2025-08-28 ENCOUNTER — TRANSCRIPTION ENCOUNTER (OUTPATIENT)
Age: 65
End: 2025-08-28